# Patient Record
Sex: MALE | Employment: FULL TIME | URBAN - METROPOLITAN AREA
[De-identification: names, ages, dates, MRNs, and addresses within clinical notes are randomized per-mention and may not be internally consistent; named-entity substitution may affect disease eponyms.]

---

## 2019-04-05 PROBLEM — C79.51 METASTATIC CANCER TO BONE: Status: ACTIVE | Noted: 2019-04-05

## 2019-04-05 PROBLEM — D62 ACUTE BLOOD LOSS ANEMIA: Status: ACTIVE | Noted: 2019-04-05

## 2019-04-05 PROBLEM — D49.2 LUMBAR SPINE TUMOR: Status: ACTIVE | Noted: 2019-04-05

## 2019-04-05 PROBLEM — E87.1 HYPONATREMIA: Status: ACTIVE | Noted: 2019-04-05

## 2019-04-05 PROBLEM — S32.000A LUMBAR COMPRESSION FRACTURE: Status: ACTIVE | Noted: 2019-04-05

## 2019-04-05 NOTE — PLAN OF CARE
[facility] admissions ONLY: Please call extension [phone] upon patient arrival to floor for Hospital Medicine admit team assignment and for additional admit orders for the patient. Do not page the attending, staff physician or Advanced Practice Provider with the patient on arrival (may not be in-house at the time of arrival). Call back or wait to leave beeper number when prompted.    Outside Transfer Acceptance Note       Patients name/MRN: Evaristo Griffith/MRN [id number]     Referring Physician or Mid-Level provider giving report: Dr. Power Daigle (Hospital Medicine)  Contact number: [phone]    Referral Facility: [facility] in [place], [place]    Date/Time of Acceptance: 4/5/2019 1:35 PM    Accepting Physician for admission to hospital: Marilu Cassidy MD ()    Accepting facility: [facility] Med/Surg Telemetry    Consulting Physicians from [facility] involved in case:  Dr. Twin Briscoe ([facility])    Reason for transfer request: Needs Neurosurgery evaluation of tumor at L4 causing extraosseous compression on thecal sac at that level causing bilateral lower extremity weakness    Report from Physician/Mid-Level Provider/HPI: 64 y/o male with essential HTN, ETOH abuse and prior CVA was admitted to [facility] early this am on 4/5 with bilateral lower extremity weakness for about 1 week. Patient noted on admit to have mild OMARI of Cr 1.4 with mild hyponatremia with sodium of 126. Patient given IVFs and a Banana bag and last Cr down to 1.0 and sodium improved to 132. Patient on admit had Hgb 7.1 but after IVFs has now dropped to 6.5. Patient in process of being transfused 1 unit of PBCs. Patient with no obvious active bleeding but was started on IV Protonix at 40 mg BID. Patient denies any melena or BRBPR, epistaxis or hematuria or hemoptysis. Patient also on labs noted to have normal AST, ALT, T. pete but elevated .  Patient noted to have normal TSH, Vitamin B12 and folate levels. Patient noted on exam to have decreased strength in lower extremities 1/5 and decreased sensation but reflexes intact with some muscle wasting to lower extremities. Patient with no bladder or bowel incontinence. Rectal tone not checked. Due to leg weakness patient underwent MRI of brain that was unremarkable except for remote infarct but MRI of lumbar spine revealed spinal tumor at L4 with extraosseous extension causing compression to thecal sac at that level as well as widespread other osseous lesions of spine highly concerning for metastatic cancer. Patient with no history of cancer. Dr. Briscoe from Neurosurgery was conferenced into call and he stated patient likely would need spinal decompression but asked for hospital medicine admit due to other medical needs such as anemia evaluation and help with coordination of care as patient has suspected metastatic cancer of unknown primary. Dr. Briscoe asked for Neurosurgery to be consulted. Patient was told results of MRI by transferring physician.     VS: BP: 137/72  P: 97  R: 16  T: 98.2 F  Pulse Ox 99% on room air     Past Medical History: Essential HTN, Alcohol abuse, prior CVA, Coronary artery disease, HLP    Imaging:   MRI of brain: Per verbal report. Old remote infarct but no new infarct or mass or bleed.    MRI of lumbar spine: Per verbal report. Bony tumor at L4 with extraosseous extension and compression of thecal sac at that level. Patient also noted to have diffuse osseous bony abnormalities consistent with widespread metastatic cancer and favoring likely prostate cancer. Stenosis of spine at L5-S1 and L3-L4.     To Do List upon arrival:     Consult to Neurosurgery for evaluation for surgical decompression at L4   GI consult for acute blood loss anemia    Serial H/H and monitoring for any bleeding    Marilu Cassidy MD  Senior Staff Physician  Department of Hospital Medicine  Patient Flow Center/  [place]  [phone]

## 2019-04-06 ENCOUNTER — HOSPITAL ENCOUNTER (INPATIENT)
Facility: HOSPITAL | Age: 64
LOS: 5 days | Discharge: HOME | DRG: 478 | End: 2019-04-11
Attending: INTERNAL MEDICINE | Admitting: INTERNAL MEDICINE

## 2019-04-06 DIAGNOSIS — S32.000A LUMBAR COMPRESSION FRACTURE: ICD-10-CM

## 2019-04-06 LAB
ALBUMIN SERPL BCP-MCNC: 2.1 G/DL (ref 3.5–5.2)
ALP SERPL-CCNC: 526 U/L (ref 55–135)
ALT SERPL W/O P-5'-P-CCNC: 12 U/L (ref 10–44)
ANION GAP SERPL CALC-SCNC: 9 MMOL/L (ref 8–16)
AST SERPL-CCNC: 27 U/L (ref 10–40)
BASOPHILS # BLD AUTO: 0 K/UL (ref 0–0.2)
BASOPHILS NFR BLD: 0 % (ref 0–1.9)
BILIRUB SERPL-MCNC: 0.4 MG/DL (ref 0.1–1)
BUN SERPL-MCNC: 21 MG/DL (ref 8–23)
CALCIUM SERPL-MCNC: 8.8 MG/DL (ref 8.7–10.5)
CHLORIDE SERPL-SCNC: 103 MMOL/L (ref 95–110)
CO2 SERPL-SCNC: 19 MMOL/L (ref 23–29)
COMPLEXED PSA SERPL-MCNC: 588.8 NG/ML (ref 0–4)
CREAT SERPL-MCNC: 0.8 MG/DL (ref 0.5–1.4)
DIFFERENTIAL METHOD: ABNORMAL
EOSINOPHIL # BLD AUTO: 0 K/UL (ref 0–0.5)
EOSINOPHIL NFR BLD: 0 % (ref 0–8)
ERYTHROCYTE [DISTWIDTH] IN BLOOD BY AUTOMATED COUNT: 25 % (ref 11.5–14.5)
EST. GFR  (AFRICAN AMERICAN): >60 ML/MIN/1.73 M^2
EST. GFR  (NON AFRICAN AMERICAN): >60 ML/MIN/1.73 M^2
FERRITIN SERPL-MCNC: 210 NG/ML (ref 20–300)
GLUCOSE SERPL-MCNC: 180 MG/DL (ref 70–110)
HCT VFR BLD AUTO: 26.8 % (ref 40–54)
HGB BLD-MCNC: 8.1 G/DL (ref 14–18)
IMM GRANULOCYTES # BLD AUTO: 0.03 K/UL (ref 0–0.04)
IMM GRANULOCYTES NFR BLD AUTO: 0.4 % (ref 0–0.5)
IRON SERPL-MCNC: 23 UG/DL (ref 45–160)
LYMPHOCYTES # BLD AUTO: 0.4 K/UL (ref 1–4.8)
LYMPHOCYTES NFR BLD: 5.3 % (ref 18–48)
MAGNESIUM SERPL-MCNC: 2.1 MG/DL (ref 1.6–2.6)
MCH RBC QN AUTO: 24.3 PG (ref 27–31)
MCHC RBC AUTO-ENTMCNC: 30.2 G/DL (ref 32–36)
MCV RBC AUTO: 81 FL (ref 82–98)
MONOCYTES # BLD AUTO: 0.3 K/UL (ref 0.3–1)
MONOCYTES NFR BLD: 3.6 % (ref 4–15)
NEUTROPHILS # BLD AUTO: 7.6 K/UL (ref 1.8–7.7)
NEUTROPHILS NFR BLD: 90.7 % (ref 38–73)
NRBC BLD-RTO: 1 /100 WBC
PHOSPHATE SERPL-MCNC: 3.2 MG/DL (ref 2.7–4.5)
PLATELET # BLD AUTO: 251 K/UL (ref 150–350)
PMV BLD AUTO: 10.8 FL (ref 9.2–12.9)
POTASSIUM SERPL-SCNC: 4.4 MMOL/L (ref 3.5–5.1)
PROT SERPL-MCNC: 7 G/DL (ref 6–8.4)
RBC # BLD AUTO: 3.33 M/UL (ref 4.6–6.2)
SATURATED IRON: 9 % (ref 20–50)
SODIUM SERPL-SCNC: 131 MMOL/L (ref 136–145)
TOTAL IRON BINDING CAPACITY: 249 UG/DL (ref 250–450)
TRANSFERRIN SERPL-MCNC: 168 MG/DL (ref 200–375)
WBC # BLD AUTO: 8.35 K/UL (ref 3.9–12.7)

## 2019-04-06 PROCEDURE — 84165 PATHOLOGIST INTERPRETATION SPE: ICD-10-PCS | Mod: 26,,, | Performed by: PATHOLOGY

## 2019-04-06 PROCEDURE — 83540 ASSAY OF IRON: CPT

## 2019-04-06 PROCEDURE — 25000003 PHARM REV CODE 250: Performed by: HOSPITALIST

## 2019-04-06 PROCEDURE — 86334 PATHOLOGIST INTERPRETATION IFE: ICD-10-PCS | Mod: 26,,, | Performed by: PATHOLOGY

## 2019-04-06 PROCEDURE — 36415 COLL VENOUS BLD VENIPUNCTURE: CPT

## 2019-04-06 PROCEDURE — 80053 COMPREHEN METABOLIC PANEL: CPT

## 2019-04-06 PROCEDURE — 63600175 PHARM REV CODE 636 W HCPCS: Performed by: HOSPITALIST

## 2019-04-06 PROCEDURE — 99223 PR INITIAL HOSPITAL CARE,LEVL III: ICD-10-PCS | Mod: ,,, | Performed by: HOSPITALIST

## 2019-04-06 PROCEDURE — 84153 ASSAY OF PSA TOTAL: CPT

## 2019-04-06 PROCEDURE — 25500020 PHARM REV CODE 255: Performed by: HOSPITALIST

## 2019-04-06 PROCEDURE — 11000001 HC ACUTE MED/SURG PRIVATE ROOM

## 2019-04-06 PROCEDURE — A9585 GADOBUTROL INJECTION: HCPCS | Performed by: HOSPITALIST

## 2019-04-06 PROCEDURE — 82728 ASSAY OF FERRITIN: CPT

## 2019-04-06 PROCEDURE — 85025 COMPLETE CBC W/AUTO DIFF WBC: CPT

## 2019-04-06 PROCEDURE — 63600175 PHARM REV CODE 636 W HCPCS: Performed by: STUDENT IN AN ORGANIZED HEALTH CARE EDUCATION/TRAINING PROGRAM

## 2019-04-06 PROCEDURE — 25000003 PHARM REV CODE 250: Performed by: STUDENT IN AN ORGANIZED HEALTH CARE EDUCATION/TRAINING PROGRAM

## 2019-04-06 PROCEDURE — 99223 1ST HOSP IP/OBS HIGH 75: CPT | Mod: ,,, | Performed by: HOSPITALIST

## 2019-04-06 PROCEDURE — 86334 IMMUNOFIX E-PHORESIS SERUM: CPT | Mod: 26,,, | Performed by: PATHOLOGY

## 2019-04-06 PROCEDURE — 84165 PROTEIN E-PHORESIS SERUM: CPT

## 2019-04-06 PROCEDURE — 83735 ASSAY OF MAGNESIUM: CPT

## 2019-04-06 PROCEDURE — 86334 IMMUNOFIX E-PHORESIS SERUM: CPT

## 2019-04-06 PROCEDURE — 84100 ASSAY OF PHOSPHORUS: CPT

## 2019-04-06 PROCEDURE — 84165 PROTEIN E-PHORESIS SERUM: CPT | Mod: 26,,, | Performed by: PATHOLOGY

## 2019-04-06 PROCEDURE — 83520 IMMUNOASSAY QUANT NOS NONAB: CPT | Mod: 59

## 2019-04-06 RX ORDER — IBUPROFEN 200 MG
16 TABLET ORAL
Status: DISCONTINUED | OUTPATIENT
Start: 2019-04-06 | End: 2019-04-11 | Stop reason: HOSPADM

## 2019-04-06 RX ORDER — IBUPROFEN 200 MG
24 TABLET ORAL
Status: DISCONTINUED | OUTPATIENT
Start: 2019-04-06 | End: 2019-04-11 | Stop reason: HOSPADM

## 2019-04-06 RX ORDER — PANTOPRAZOLE SODIUM 40 MG/1
40 TABLET, DELAYED RELEASE ORAL DAILY
Status: DISCONTINUED | OUTPATIENT
Start: 2019-04-06 | End: 2019-04-11 | Stop reason: HOSPADM

## 2019-04-06 RX ORDER — AMLODIPINE BESYLATE 10 MG/1
10 TABLET ORAL DAILY
Status: DISCONTINUED | OUTPATIENT
Start: 2019-04-06 | End: 2019-04-06

## 2019-04-06 RX ORDER — SODIUM CHLORIDE 0.9 % (FLUSH) 0.9 %
10 SYRINGE (ML) INJECTION
Status: DISCONTINUED | OUTPATIENT
Start: 2019-04-06 | End: 2019-04-11 | Stop reason: HOSPADM

## 2019-04-06 RX ORDER — ACETAMINOPHEN 325 MG/1
650 TABLET ORAL EVERY 4 HOURS PRN
Status: DISCONTINUED | OUTPATIENT
Start: 2019-04-06 | End: 2019-04-11 | Stop reason: HOSPADM

## 2019-04-06 RX ORDER — ENOXAPARIN SODIUM 100 MG/ML
40 INJECTION SUBCUTANEOUS DAILY
Status: DISCONTINUED | OUTPATIENT
Start: 2019-04-06 | End: 2019-04-10

## 2019-04-06 RX ORDER — CARVEDILOL 25 MG/1
25 TABLET ORAL 2 TIMES DAILY
Status: DISCONTINUED | OUTPATIENT
Start: 2019-04-06 | End: 2019-04-06

## 2019-04-06 RX ORDER — GADOBUTROL 604.72 MG/ML
7 INJECTION INTRAVENOUS
Status: COMPLETED | OUTPATIENT
Start: 2019-04-06 | End: 2019-04-06

## 2019-04-06 RX ORDER — GLUCAGON 1 MG
1 KIT INJECTION
Status: DISCONTINUED | OUTPATIENT
Start: 2019-04-06 | End: 2019-04-11 | Stop reason: HOSPADM

## 2019-04-06 RX ORDER — CARVEDILOL 12.5 MG/1
12.5 TABLET ORAL 2 TIMES DAILY
Status: DISCONTINUED | OUTPATIENT
Start: 2019-04-06 | End: 2019-04-11 | Stop reason: HOSPADM

## 2019-04-06 RX ORDER — NAPROXEN SODIUM 220 MG/1
81 TABLET, FILM COATED ORAL DAILY
Status: DISCONTINUED | OUTPATIENT
Start: 2019-04-06 | End: 2019-04-07

## 2019-04-06 RX ORDER — CLOPIDOGREL BISULFATE 75 MG/1
75 TABLET ORAL DAILY
Status: DISCONTINUED | OUTPATIENT
Start: 2019-04-06 | End: 2019-04-07

## 2019-04-06 RX ADMIN — DEXAMETHASONE SODIUM PHOSPHATE 4 MG: 4 INJECTION, SOLUTION INTRA-ARTICULAR; INTRALESIONAL; INTRAMUSCULAR; INTRAVENOUS; SOFT TISSUE at 04:04

## 2019-04-06 RX ADMIN — CARVEDILOL 12.5 MG: 12.5 TABLET, FILM COATED ORAL at 09:04

## 2019-04-06 RX ADMIN — GADOBUTROL 7 ML: 604.72 INJECTION INTRAVENOUS at 02:04

## 2019-04-06 RX ADMIN — CLOPIDOGREL 75 MG: 75 TABLET, FILM COATED ORAL at 09:04

## 2019-04-06 RX ADMIN — PANTOPRAZOLE SODIUM 40 MG: 40 TABLET, DELAYED RELEASE ORAL at 09:04

## 2019-04-06 RX ADMIN — DEXAMETHASONE SODIUM PHOSPHATE 4 MG: 4 INJECTION, SOLUTION INTRA-ARTICULAR; INTRALESIONAL; INTRAMUSCULAR; INTRAVENOUS; SOFT TISSUE at 10:04

## 2019-04-06 RX ADMIN — DEXAMETHASONE SODIUM PHOSPHATE 4 MG: 4 INJECTION, SOLUTION INTRA-ARTICULAR; INTRALESIONAL; INTRAMUSCULAR; INTRAVENOUS; SOFT TISSUE at 09:04

## 2019-04-06 RX ADMIN — ENOXAPARIN SODIUM 40 MG: 100 INJECTION SUBCUTANEOUS at 04:04

## 2019-04-06 RX ADMIN — AMLODIPINE BESYLATE 10 MG: 10 TABLET ORAL at 09:04

## 2019-04-06 RX ADMIN — ASPIRIN 81 MG CHEWABLE TABLET 81 MG: 81 TABLET CHEWABLE at 09:04

## 2019-04-06 NOTE — H&P
[facility] Hospital Medicine  History & Physical    Patient Name: Evaristo Griffith  MRN: [id number]  Admission Date: 4/6/2019  Attending Physician: Susanna Pyle MD   Primary Care Provider: Primary Doctor [facility] Medicine Team: [facility] HOSP MED 5 Medhat Chairez MD     Patient information was obtained from patient, past medical records and ER records.     Subjective:     Principal Problem:Lumbar spine tumor    Chief Complaint: No chief complaint on file.       HPI: 64 y/o male with essential HTN, CAD, ETOH abuse and prior CVA was admitted to [facility] early this am on 4/5 with bilateral lower extremity weakness for about 1 week. He also complained of loss of sensation in bilateral legs, and issues with constipation.  By the time he had arrived at the ED in [place] he states that most of his symptoms had began to resolve.  Patient noted on admit to have mild OMARI of Cr 1.4 with mild hyponatremia with sodium of 126. Patient given IVFs and a Banana bag and last Cr down to 1.0 and sodium improved to 132. Patient on admit had Hgb 7.1 but after IVFs has now dropped to 6.5. Patient in process of being transfused 1 unit of PBCs. Patient with no obvious active bleeding but was started on IV Protonix at 40 mg BID. Patient denies any melena or BRBPR, epistaxis or hematuria or hemoptysis. Patient also on labs noted to have normal AST, ALT, T. pete but elevated . Patient noted to have normal TSH, Vitamin B12 and folate levels. Patient noted on exam to have decreased strength in lower extremities 1/5 and decreased sensation but reflexes intact with some muscle wasting to lower extremities. Patient with no bladder or bowel incontinence. Rectal tone not checked. Due to leg weakness patient underwent MRI of brain that was unremarkable except for remote infarct but MRI of lumbar spine revealed spinal tumor at L4 with extraosseous extension causing compression to  thecal sac at that level as well as widespread other osseous lesions of spine highly concerning for metastatic cancer. Patient with no history of cancer. Dr. Briscoe from Neurosurgery was conferenced into call and he stated patient likely would need spinal decompression but asked for hospital medicine admit due to other medical needs such as anemia evaluation and help with coordination of care as patient has suspected metastatic cancer of unknown primary. Dr. Brisceo asked for Neurosurgery to be consulted. Patient was told results of MRI by transferring physician.         Past Medical History:   Diagnosis Date    Alcohol abuse     Coronary artery disease involving native coronary artery of native heart     Essential hypertension     Hyperlipidemia     Stroke        History reviewed. No pertinent surgical history.    Review of patient's allergies indicates:  No Known Allergies    No current facility-administered medications on file prior to encounter.      No current outpatient medications on file prior to encounter.     Family History     None        Tobacco Use    Smoking status: Not on file   Substance and Sexual Activity    Alcohol use: Not on file    Drug use: Not on file    Sexual activity: Not on file     Review of Systems   Constitutional: Positive for activity change and appetite change. Negative for chills, fatigue and fever.   HENT: Negative for congestion and sinus pain.    Eyes: Negative for visual disturbance.   Respiratory: Negative for cough and shortness of breath.    Cardiovascular: Negative for chest pain and palpitations.   Gastrointestinal: Negative for abdominal distention and abdominal pain.   Genitourinary: Positive for difficulty urinating.   Musculoskeletal: Positive for back pain and gait problem. Negative for arthralgias.   Skin: Negative for color change and pallor.   Neurological: Positive for weakness and numbness. Negative for dizziness, light-headedness and headaches.      Objective:     Vital Signs (Most Recent):    Vital Signs (24h Range):  Temp:  [97.8 °F (36.6 °C)-98.2 °F (36.8 °C)] 97.8 °F (36.6 °C)  Pulse:  [70-97] 70  Resp:  [16-18] 18  SpO2:  [98 %-100 %] 100 %  BP: (119-137)/(72-91) 132/91        There is no height or weight on file to calculate BMI.    Physical Exam   Constitutional: He is oriented to person, place, and time. He appears well-developed and well-nourished. No distress.   HENT:   Head: Normocephalic and atraumatic.   Eyes: Conjunctivae and EOM are normal.   Neck: Normal range of motion.   Cardiovascular: Normal rate, regular rhythm and normal heart sounds.   No murmur heard.  Pulmonary/Chest: Effort normal and breath sounds normal. No respiratory distress.   Abdominal: Soft. Bowel sounds are normal. He exhibits no distension. There is no tenderness.   Musculoskeletal: Normal range of motion. He exhibits no edema or deformity.   Neurological: He is alert and oriented to person, place, and time. No cranial nerve deficit or sensory deficit.   5/5 strength BUE and BLE  5/5 plantar and dorsiflexion   Skin: Skin is warm. Capillary refill takes less than 2 seconds.   Nursing note and vitals reviewed.        CRANIAL NERVES     CN III, IV, VI   Extraocular motions are normal.        Significant Labs:   Recent Lab Results     None          Significant Imaging: I have reviewed all pertinent imaging results/findings within the past 24 hours.    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  Patient had BLE weakness for 3 days with associated constipation and loss of sensation in the LE but had resolved by the time he went to the ED.  At this time his strength and sensation has returned to full function.    -Neurosurgery consulted, know that patient is being transferred here  -F/U MRI spine      Hyponatremia  Most recent  measured Na was 132 according to transfer documents.  Will check CMP and assess if fluid restriction is needed to return Na to wnl      Acute blood loss anemia  No obvious signs of bleeding.  Was intiailly 7.1 on presentation, received IVF and subsequent Hgb was 6.5.  Received 1u pRBC before transfer.  Will check morning CBC and monitor for any signs of bleeding      Coronary artery disease involving native coronary artery of native heart  Continuing ASA and plavix      Essential hypertension  Is on coreg 25 BID and norvasc 10 at home but reports only taking it when he has a headache.  Holding these medications for now as he is normotensive on most recent BP        VTE Risk Mitigation (From admission, onward)        Ordered     enoxaparin injection 40 mg  Daily      04/06/19 0535     IP VTE HIGH RISK PATIENT  Once      04/06/19 0535             Medhat Chairez MD  Department of Hospital Medicine   [facility]

## 2019-04-06 NOTE — ASSESSMENT & PLAN NOTE
Is on coreg 25 BID and norvasc 10 at home but reports only taking it when he has a headache.  Holding these medications for now as he is normotensive on most recent BP

## 2019-04-06 NOTE — ASSESSMENT & PLAN NOTE
MRI at outside hospital revealed an L4 spinal tumor without numerous other lesions throughout the spine that is concerning for metastatic disease.  Patient had BLE weakness for 3 days with associated constipation and loss of sensation in the LE but had resolved by the time he went to the ED.  At this time his strength and sensation has returned to full function.    -Neurosurgery consulted, know that patient is being transferred here  -F/U MRI spine

## 2019-04-06 NOTE — ASSESSMENT & PLAN NOTE
Most recent measured Na was 132 according to transfer documents.  Will check CMP and assess if fluid restriction is needed to return Na to wnl

## 2019-04-06 NOTE — LETTER
April 7, 2019    Evaristo Griffith  [address] [place] [address]                [address] [facility] [address]  Phone: [phone]  Fax: [phone] To whom it may concern in regards to Paul Griffith:    Please excuse Mr. Paul Griffith to take care of his father, Mr. Evaristo Griffith, during his current hospitalization from 4/06/2019 to estimated date of discharge to be 4/12/2019    If you have any questions or concerns, please don't hesitate to call the Department of Internal Medicine.    Sincerely,        Briseida Maza MD

## 2019-04-06 NOTE — SUBJECTIVE & OBJECTIVE
Past Medical History:   Diagnosis Date    Alcohol abuse     Coronary artery disease involving native coronary artery of native heart     Essential hypertension     Hyperlipidemia     Stroke        History reviewed. No pertinent surgical history.    Review of patient's allergies indicates:  No Known Allergies    No current facility-administered medications on file prior to encounter.      No current outpatient medications on file prior to encounter.     Family History     None        Tobacco Use    Smoking status: Not on file   Substance and Sexual Activity    Alcohol use: Not on file    Drug use: Not on file    Sexual activity: Not on file     Review of Systems   Constitutional: Positive for activity change and appetite change. Negative for chills, fatigue and fever.   HENT: Negative for congestion and sinus pain.    Eyes: Negative for visual disturbance.   Respiratory: Negative for cough and shortness of breath.    Cardiovascular: Negative for chest pain and palpitations.   Gastrointestinal: Negative for abdominal distention and abdominal pain.   Genitourinary: Positive for difficulty urinating.   Musculoskeletal: Positive for back pain and gait problem. Negative for arthralgias.   Skin: Negative for color change and pallor.   Neurological: Positive for weakness and numbness. Negative for dizziness, light-headedness and headaches.     Objective:     Vital Signs (Most Recent):    Vital Signs (24h Range):  Temp:  [97.8 °F (36.6 °C)-98.2 °F (36.8 °C)] 97.8 °F (36.6 °C)  Pulse:  [70-97] 70  Resp:  [16-18] 18  SpO2:  [98 %-100 %] 100 %  BP: (119-137)/(72-91) 132/91        There is no height or weight on file to calculate BMI.    Physical Exam   Constitutional: He is oriented to person, place, and time. He appears well-developed and well-nourished. No distress.   HENT:   Head: Normocephalic and atraumatic.   Eyes: Conjunctivae and EOM are normal.   Neck: Normal range of motion.   Cardiovascular: Normal rate,  regular rhythm and normal heart sounds.   No murmur heard.  Pulmonary/Chest: Effort normal and breath sounds normal. No respiratory distress.   Abdominal: Soft. Bowel sounds are normal. He exhibits no distension. There is no tenderness.   Musculoskeletal: Normal range of motion. He exhibits no edema or deformity.   Neurological: He is alert and oriented to person, place, and time. No cranial nerve deficit or sensory deficit.   5/5 strength BUE and BLE  5/5 plantar and dorsiflexion   Skin: Skin is warm. Capillary refill takes less than 2 seconds.   Nursing note and vitals reviewed.        CRANIAL NERVES     CN III, IV, VI   Extraocular motions are normal.        Significant Labs:   Recent Lab Results     None          Significant Imaging: I have reviewed all pertinent imaging results/findings within the past 24 hours.

## 2019-04-06 NOTE — ASSESSMENT & PLAN NOTE
No obvious signs of bleeding.  Was intiailly 7.1 on presentation, received IVF and subsequent Hgb was 6.5.  Received 1u pRBC before transfer.  Will check morning CBC and monitor for any signs of bleeding

## 2019-04-06 NOTE — HPI
62 y/o male with essential HTN, CAD, ETOH abuse and prior CVA was admitted to [facility] early this am on 4/5 with bilateral lower extremity weakness for about 1 week. He also complained of loss of sensation in bilateral legs, and issues with constipation.  By the time he had arrived at the ED in [place] he states that most of his symptoms had began to resolve.  Patient noted on admit to have mild OMARI of Cr 1.4 with mild hyponatremia with sodium of 126. Patient given IVFs and a Banana bag and last Cr down to 1.0 and sodium improved to 132. Patient on admit had Hgb 7.1 but after IVFs has now dropped to 6.5. Patient in process of being transfused 1 unit of PBCs. Patient with no obvious active bleeding but was started on IV Protonix at 40 mg BID. Patient denies any melena or BRBPR, epistaxis or hematuria or hemoptysis. Patient also on labs noted to have normal AST, ALT, T. pete but elevated . Patient noted to have normal TSH, Vitamin B12 and folate levels. Patient noted on exam to have decreased strength in lower extremities 1/5 and decreased sensation but reflexes intact with some muscle wasting to lower extremities. Patient with no bladder or bowel incontinence. Rectal tone not checked. Due to leg weakness patient underwent MRI of brain that was unremarkable except for remote infarct but MRI of lumbar spine revealed spinal tumor at L4 with extraosseous extension causing compression to thecal sac at that level as well as widespread other osseous lesions of spine highly concerning for metastatic cancer. Patient with no history of cancer. Dr. Briscoe from Neurosurgery was conferenced into call and he stated patient likely would need spinal decompression but asked for hospital medicine admit due to other medical needs such as anemia evaluation and help with coordination of care as patient has suspected metastatic cancer of unknown primary. Dr. Briscoe asked for Neurosurgery to be  consulted. Patient was told results of MRI by transferring physician.

## 2019-04-07 LAB
ABO + RH BLD: NORMAL
ALBUMIN SERPL BCP-MCNC: 2.1 G/DL (ref 3.5–5.2)
ALP SERPL-CCNC: 478 U/L (ref 55–135)
ALT SERPL W/O P-5'-P-CCNC: 23 U/L (ref 10–44)
ANION GAP SERPL CALC-SCNC: 10 MMOL/L (ref 8–16)
APTT BLDCRRT: 30.6 SEC (ref 21–32)
AST SERPL-CCNC: 43 U/L (ref 10–40)
BASOPHILS # BLD AUTO: 0 K/UL (ref 0–0.2)
BASOPHILS NFR BLD: 0 % (ref 0–1.9)
BILIRUB SERPL-MCNC: 0.3 MG/DL (ref 0.1–1)
BLD GP AB SCN CELLS X3 SERPL QL: NORMAL
BUN SERPL-MCNC: 21 MG/DL (ref 8–23)
CALCIUM SERPL-MCNC: 8.3 MG/DL (ref 8.7–10.5)
CHLORIDE SERPL-SCNC: 103 MMOL/L (ref 95–110)
CO2 SERPL-SCNC: 18 MMOL/L (ref 23–29)
CREAT SERPL-MCNC: 0.8 MG/DL (ref 0.5–1.4)
DIFFERENTIAL METHOD: ABNORMAL
EOSINOPHIL # BLD AUTO: 0 K/UL (ref 0–0.5)
EOSINOPHIL NFR BLD: 0 % (ref 0–8)
ERYTHROCYTE [DISTWIDTH] IN BLOOD BY AUTOMATED COUNT: 25.2 % (ref 11.5–14.5)
EST. GFR  (AFRICAN AMERICAN): >60 ML/MIN/1.73 M^2
EST. GFR  (NON AFRICAN AMERICAN): >60 ML/MIN/1.73 M^2
GLUCOSE SERPL-MCNC: 186 MG/DL (ref 70–110)
HCT VFR BLD AUTO: 24.8 % (ref 40–54)
HGB BLD-MCNC: 7.5 G/DL (ref 14–18)
IMM GRANULOCYTES # BLD AUTO: 0.06 K/UL (ref 0–0.04)
IMM GRANULOCYTES NFR BLD AUTO: 0.7 % (ref 0–0.5)
INR PPP: 1.1 (ref 0.8–1.2)
LYMPHOCYTES # BLD AUTO: 0.4 K/UL (ref 1–4.8)
LYMPHOCYTES NFR BLD: 5.1 % (ref 18–48)
MAGNESIUM SERPL-MCNC: 1.8 MG/DL (ref 1.6–2.6)
MCH RBC QN AUTO: 24 PG (ref 27–31)
MCHC RBC AUTO-ENTMCNC: 30.2 G/DL (ref 32–36)
MCV RBC AUTO: 80 FL (ref 82–98)
MONOCYTES # BLD AUTO: 0.5 K/UL (ref 0.3–1)
MONOCYTES NFR BLD: 6.1 % (ref 4–15)
NEUTROPHILS # BLD AUTO: 7.6 K/UL (ref 1.8–7.7)
NEUTROPHILS NFR BLD: 88.1 % (ref 38–73)
NRBC BLD-RTO: 1 /100 WBC
PHOSPHATE SERPL-MCNC: 2.6 MG/DL (ref 2.7–4.5)
PLATELET # BLD AUTO: 266 K/UL (ref 150–350)
PMV BLD AUTO: 10.4 FL (ref 9.2–12.9)
POTASSIUM SERPL-SCNC: 4.4 MMOL/L (ref 3.5–5.1)
PROT SERPL-MCNC: 6.6 G/DL (ref 6–8.4)
PROTHROMBIN TIME: 11.5 SEC (ref 9–12.5)
RBC # BLD AUTO: 3.12 M/UL (ref 4.6–6.2)
SODIUM SERPL-SCNC: 131 MMOL/L (ref 136–145)
WBC # BLD AUTO: 8.67 K/UL (ref 3.9–12.7)

## 2019-04-07 PROCEDURE — 63600175 PHARM REV CODE 636 W HCPCS: Performed by: HOSPITALIST

## 2019-04-07 PROCEDURE — 85730 THROMBOPLASTIN TIME PARTIAL: CPT

## 2019-04-07 PROCEDURE — 80053 COMPREHEN METABOLIC PANEL: CPT

## 2019-04-07 PROCEDURE — 11000001 HC ACUTE MED/SURG PRIVATE ROOM

## 2019-04-07 PROCEDURE — 25500020 PHARM REV CODE 255: Performed by: HOSPITALIST

## 2019-04-07 PROCEDURE — 85025 COMPLETE CBC W/AUTO DIFF WBC: CPT

## 2019-04-07 PROCEDURE — 86850 RBC ANTIBODY SCREEN: CPT

## 2019-04-07 PROCEDURE — 25000003 PHARM REV CODE 250: Performed by: HOSPITALIST

## 2019-04-07 PROCEDURE — 99223 PR INITIAL HOSPITAL CARE,LEVL III: ICD-10-PCS | Mod: ,,, | Performed by: NEUROLOGICAL SURGERY

## 2019-04-07 PROCEDURE — 94761 N-INVAS EAR/PLS OXIMETRY MLT: CPT

## 2019-04-07 PROCEDURE — 99232 PR SUBSEQUENT HOSPITAL CARE,LEVL II: ICD-10-PCS | Mod: ,,, | Performed by: HOSPITALIST

## 2019-04-07 PROCEDURE — 83735 ASSAY OF MAGNESIUM: CPT

## 2019-04-07 PROCEDURE — 99232 SBSQ HOSP IP/OBS MODERATE 35: CPT | Mod: ,,, | Performed by: HOSPITALIST

## 2019-04-07 PROCEDURE — 63600175 PHARM REV CODE 636 W HCPCS: Performed by: STUDENT IN AN ORGANIZED HEALTH CARE EDUCATION/TRAINING PROGRAM

## 2019-04-07 PROCEDURE — 36415 COLL VENOUS BLD VENIPUNCTURE: CPT

## 2019-04-07 PROCEDURE — 99223 1ST HOSP IP/OBS HIGH 75: CPT | Mod: ,,, | Performed by: NEUROLOGICAL SURGERY

## 2019-04-07 PROCEDURE — 84100 ASSAY OF PHOSPHORUS: CPT

## 2019-04-07 PROCEDURE — 25000003 PHARM REV CODE 250: Performed by: STUDENT IN AN ORGANIZED HEALTH CARE EDUCATION/TRAINING PROGRAM

## 2019-04-07 PROCEDURE — 85610 PROTHROMBIN TIME: CPT

## 2019-04-07 PROCEDURE — 25500020 PHARM REV CODE 255: Performed by: STUDENT IN AN ORGANIZED HEALTH CARE EDUCATION/TRAINING PROGRAM

## 2019-04-07 RX ORDER — GADOBUTROL 604.72 MG/ML
7 INJECTION INTRAVENOUS
Status: COMPLETED | OUTPATIENT
Start: 2019-04-08 | End: 2019-04-07

## 2019-04-07 RX ORDER — ACETAMINOPHEN 500 MG
500-1000 TABLET ORAL DAILY PRN
COMMUNITY

## 2019-04-07 RX ORDER — LORAZEPAM 1 MG/1
1 TABLET ORAL ONCE AS NEEDED
Status: COMPLETED | OUTPATIENT
Start: 2019-04-07 | End: 2019-04-07

## 2019-04-07 RX ADMIN — CARVEDILOL 12.5 MG: 12.5 TABLET, FILM COATED ORAL at 08:04

## 2019-04-07 RX ADMIN — IOHEXOL 15 ML: 350 INJECTION, SOLUTION INTRAVENOUS at 09:04

## 2019-04-07 RX ADMIN — DEXAMETHASONE SODIUM PHOSPHATE 4 MG: 4 INJECTION, SOLUTION INTRA-ARTICULAR; INTRALESIONAL; INTRAMUSCULAR; INTRAVENOUS; SOFT TISSUE at 01:04

## 2019-04-07 RX ADMIN — IOHEXOL 75 ML: 350 INJECTION, SOLUTION INTRAVENOUS at 10:04

## 2019-04-07 RX ADMIN — PANTOPRAZOLE SODIUM 40 MG: 40 TABLET, DELAYED RELEASE ORAL at 08:04

## 2019-04-07 RX ADMIN — DEXAMETHASONE SODIUM PHOSPHATE 4 MG: 4 INJECTION, SOLUTION INTRA-ARTICULAR; INTRALESIONAL; INTRAMUSCULAR; INTRAVENOUS; SOFT TISSUE at 05:04

## 2019-04-07 RX ADMIN — LORAZEPAM 1 MG: 1 TABLET ORAL at 10:04

## 2019-04-07 RX ADMIN — CLOPIDOGREL 75 MG: 75 TABLET, FILM COATED ORAL at 08:04

## 2019-04-07 RX ADMIN — ENOXAPARIN SODIUM 40 MG: 100 INJECTION SUBCUTANEOUS at 05:04

## 2019-04-07 RX ADMIN — DEXAMETHASONE SODIUM PHOSPHATE 4 MG: 4 INJECTION, SOLUTION INTRA-ARTICULAR; INTRALESIONAL; INTRAMUSCULAR; INTRAVENOUS; SOFT TISSUE at 06:04

## 2019-04-07 RX ADMIN — GADOBUTROL 7 ML: 604.72 INJECTION INTRAVENOUS at 11:04

## 2019-04-07 RX ADMIN — IOHEXOL 15 ML: 350 INJECTION, SOLUTION INTRAVENOUS at 08:04

## 2019-04-07 RX ADMIN — ASPIRIN 81 MG CHEWABLE TABLET 81 MG: 81 TABLET CHEWABLE at 08:04

## 2019-04-07 NOTE — ASSESSMENT & PLAN NOTE
63M PMH HTN, CAD, ETOH abuse and prior CVA presenting with 1 week of BLE weakness and numbness that has resolved. MRI reveals L4 mass and osseous lesions concerning for metastatic disease.      -- Obtain CT chest/abdomen/pelvis metastatic workup  -- PSA elevated 500s

## 2019-04-07 NOTE — ASSESSMENT & PLAN NOTE
Is on coreg 25 BID and norvasc 10 at home but reports only taking it when he has a headache. Held earlier for hypotension. Added back coreg

## 2019-04-07 NOTE — PLAN OF CARE
Problem: Adult Inpatient Plan of Care  Goal: Plan of Care Review  Outcome: Ongoing (interventions implemented as appropriate)  Pt remains free of falls and injury. Pt makes statement of no pain. Pt using urinal for 24 hour urine collection. Bed low and locked, Call light within reach.

## 2019-04-07 NOTE — PROGRESS NOTES
Pt stated he used the toilet instead of the urinal 4-5x today. 24 hr urine collection d/c.  notified. 24 hr urine restarted 1400.

## 2019-04-07 NOTE — ASSESSMENT & PLAN NOTE
63M PMH HTN, CAD, ETOH abuse and prior CVA presenting with 1 week of BLE weakness and numbness that has resolved. MRI reveals L4 mass and osseous lesions concerning for metastatic disease. Neurointact on my exam.    -- No acute neurosurgical intervention necessary.   -- Keep NPO for now please.  -- Obtain CT chest/abdomen/pelvis metastatic workup.  -- Obtain CT lumbar spine without contrast.  -- Dex 4q6  -- Neurochecks q4h. Call NSGY with decline in exam.  -- Please measure post void residuals.  -- Further care per primary team.   -- Further recs to follow pending attending review.

## 2019-04-07 NOTE — SUBJECTIVE & OBJECTIVE
Past Medical History:   Diagnosis Date    Alcohol abuse     Coronary artery disease involving native coronary artery of native heart     Essential hypertension     Hyperlipidemia     Stroke        History reviewed. No pertinent surgical history.    Review of patient's allergies indicates:  No Known Allergies    No current facility-administered medications on file prior to encounter.      Current Outpatient Medications on File Prior to Encounter   Medication Sig    acetaminophen (TYLENOL) 500 MG tablet Take 500-1,000 mg by mouth daily as needed (back pain).    ASA/acetaminophen/caffeine/pot (GOODY'S HEADACHE POWDER ORAL) Take 2 Packages by mouth daily as needed (back pain).     Family History     None        Tobacco Use    Smoking status: Current Every Day Smoker     Packs/day: 1.00     Years: 55.00     Pack years: 55.00     Types: Cigarettes    Smokeless tobacco: Never Used   Substance and Sexual Activity    Alcohol use: Not on file    Drug use: Not on file    Sexual activity: Not on file     Review of Systems   Constitutional: Positive for activity change and appetite change. Negative for chills, fatigue and fever.   HENT: Negative for congestion and sinus pain.    Eyes: Negative for visual disturbance.   Respiratory: Negative for cough and shortness of breath.    Cardiovascular: Negative for chest pain and palpitations.   Gastrointestinal: Negative for abdominal distention and abdominal pain.   Genitourinary: Positive for difficulty urinating.   Musculoskeletal: Positive for back pain and gait problem. Negative for arthralgias.   Skin: Negative for color change and pallor.   Neurological: Positive for weakness and numbness. Negative for dizziness, light-headedness and headaches.     Objective:     Vital Signs (Most Recent):  Temp: 96.7 °F (35.9 °C) (04/07/19 1126)  Pulse: 62 (04/07/19 1126)  Resp: 18 (04/07/19 1126)  BP: (!) 150/79 (04/07/19 1126)  SpO2: 97 % (04/07/19 1126) Vital Signs (24h  Range):  Temp:  [96.7 °F (35.9 °C)-98.4 °F (36.9 °C)] 96.7 °F (35.9 °C)  Pulse:  [62-71] 62  Resp:  [15-18] 18  SpO2:  [96 %-100 %] 97 %  BP: (139-165)/(68-85) 150/79     Weight: 63.6 kg (140 lb 3.4 oz)  Body mass index is 19.02 kg/m².    Physical Exam   Constitutional: He is oriented to person, place, and time. He appears well-developed and well-nourished. No distress.   HENT:   Head: Normocephalic and atraumatic.   Eyes: Conjunctivae and EOM are normal.   Neck: Normal range of motion.   Cardiovascular: Normal rate, regular rhythm and normal heart sounds.   No murmur heard.  Pulmonary/Chest: Effort normal and breath sounds normal. No respiratory distress.   Abdominal: Soft. Bowel sounds are normal. He exhibits no distension. There is no tenderness.   Musculoskeletal: Normal range of motion. He exhibits no edema or deformity.   Neurological: He is alert and oriented to person, place, and time. No cranial nerve deficit or sensory deficit.   5/5 strength BUE and BLE  5/5 plantar and dorsiflexion   Skin: Skin is warm. Capillary refill takes less than 2 seconds.   Nursing note and vitals reviewed.        CRANIAL NERVES     CN III, IV, VI   Extraocular motions are normal.        Significant Labs:   Recent Lab Results       04/07/19  0521   04/07/19  0429        Albumin   2.1     Alkaline Phosphatase   478     ALT   23     Anion Gap   10     aPTT 30.6  Comment:  aPTT therapeutic range = 39-69 seconds       AST   43     Baso #   0.00     Basophil%   0.0     Total Bilirubin   0.3  Comment:  For infants and newborns, interpretation of results should be based  on gestational age, weight and in agreement with clinical  observations.  Premature Infant recommended reference ranges:  Up to 24 hours.............<8.0 mg/dL  Up to 48 hours............<12.0 mg/dL  3-5 days..................<15.0 mg/dL  6-29 days.................<15.0 mg/dL       BUN, Bld   21     Calcium   8.3     Chloride   103     CO2   18     Creatinine   0.8      Differential Method   Automated     eGFR if    >60.0     eGFR if non    >60.0  Comment:  Calculation used to obtain the estimated glomerular filtration  rate (eGFR) is the CKD-EPI equation.        Eos #   0.0     Eosinophil%   0.0     Glucose   186     Gran # (ANC)   7.6     Gran%   88.1     Group & Rh O POS       Hematocrit   24.8     Hemoglobin   7.5     Immature Grans (Abs)   0.06  Comment:  Mild elevation in immature granulocytes is non specific and   can be seen in a variety of conditions including stress response,   acute inflammation, trauma and pregnancy. Correlation with other   laboratory and clinical findings is essential.       Immature Granulocytes   0.7     INDIRECT GÓMEZ NEG       Coumadin Monitoring INR 1.1  Comment:  Coumadin Therapy:  2.0 - 3.0 for INR for all indicators except mechanical heart valves  and antiphospholipid syndromes which should use 2.5 - 3.5.         Lymph #   0.4     Lymph%   5.1     Magnesium   1.8     MCH   24.0     MCHC   30.2     MCV   80     Mono #   0.5     Mono%   6.1     MPV   10.4     nRBC   1     Phosphorus   2.6     Platelets   266     Potassium   4.4     Total Protein   6.6     Protime 11.5       RBC   3.12     RDW   25.2     Sodium   131     WBC   8.67           Significant Imaging: I have reviewed all pertinent imaging results/findings within the past 24 hours.

## 2019-04-07 NOTE — ASSESSMENT & PLAN NOTE
MRI at outside hospital revealed an L4 spinal tumor without numerous other lesions throughout the spine that is concerning for metastatic disease.  Patient had BLE weakness for 3 days with associated constipation and loss of sensation in the LE but had resolved by the time he went to the ED.  At this time his strength and sensation has returned to full function.    63M PMH HTN, CAD, ETOH abuse and prior CVA presenting with 1 week of BLE weakness and numbness that has resolved. MRI reveals L4 mass and osseous lesions concerning for metastatic disease. Neurointact on my exam.     -- decadron 4mg q 6hr  -- he will require mass biopsy  -- NPO at [facility]   -- Obtaining CT chest/abdomen/pelvis metastatic workup.  -- Obtain CT lumbar spine without contrast.  -- Repeating MRI lumbar   -- Neurochecks q4h. Call NSGY with decline in exam.

## 2019-04-07 NOTE — HPI
Patient is a 63 year old male with a history of HTN, CAD, ETOH abuse and prior CVA who complained of BLE weakness of about 1 week. He also complained of loss of sensation in bilateral legs, and issues with constipation. Denies bowel or bladder incontinence. His symptoms have since resolved. Due to leg weakness patient underwent MRI of brain that was unremarkable except for remote infarct but MRI of lumbar spine revealed spinal tumor at L4 with extraosseous extension causing compression to thecal sac at that level as well as widespread other osseous lesions of spine highly concerning for metastatic cancer. Patient with no history of cancer. Patient is a current smoker. Has no other complaints.

## 2019-04-07 NOTE — PLAN OF CARE
Problem: Adult Inpatient Plan of Care  Goal: Plan of Care Review  Outcome: Ongoing (interventions implemented as appropriate)     04/07/19 1633   Plan of Care Review   Plan of Care Reviewed With patient   Progress improving     Pt resting aaox4 no c/o pain or discomfort. V/s stable. Ambulates in room w/o complaint. Voids via urinal. 24 hr urine collects ends a 1400 4/8. Pt Instructed to use call light when he voids. Verbalized understanding. Pt scheduled for repeat mri. Prn ativan available for procedure upon request. Family at bedside. Will continue to monitor and interventions as appropriate.

## 2019-04-07 NOTE — SUBJECTIVE & OBJECTIVE
No medications prior to admission.       Review of patient's allergies indicates:  No Known Allergies    Past Medical History:   Diagnosis Date    Alcohol abuse     Coronary artery disease involving native coronary artery of native heart     Essential hypertension     Hyperlipidemia     Stroke      History reviewed. No pertinent surgical history.  Family History     None        Tobacco Use    Smoking status: Current Every Day Smoker     Packs/day: 1.00     Years: 55.00     Pack years: 55.00     Types: Cigarettes    Smokeless tobacco: Never Used   Substance and Sexual Activity    Alcohol use: Not on file    Drug use: Not on file    Sexual activity: Not on file     Review of Systems   Constitutional: Negative for chills and fever.   HENT: Negative for nosebleeds and rhinorrhea.    Eyes: Negative for photophobia and visual disturbance.   Respiratory: Negative for chest tightness and shortness of breath.    Cardiovascular: Negative for chest pain and palpitations.   Gastrointestinal: Positive for constipation. Negative for abdominal pain, nausea and vomiting.   Genitourinary: Negative for dysuria.   Musculoskeletal: Negative for back pain and neck pain.   Neurological: Positive for weakness and numbness. Negative for dizziness, syncope and headaches.   Psychiatric/Behavioral: Negative for behavioral problems and confusion.     Objective:     Weight: 63.6 kg (140 lb 3.4 oz)  Body mass index is 19.02 kg/m².  Vital Signs (Most Recent):  Temp: 98 °F (36.7 °C) (04/07/19 0017)  Pulse: 69 (04/07/19 0017)  Resp: 18 (04/07/19 0017)  BP: 139/68 (04/07/19 0017)  SpO2: 96 % (04/07/19 0017) Vital Signs (24h Range):  Temp:  [96.3 °F (35.7 °C)-98 °F (36.7 °C)] 98 °F (36.7 °C)  Pulse:  [66-72] 69  Resp:  [15-20] 18  SpO2:  [96 %-100 %] 96 %  BP: (139-154)/(68-81) 139/68       Neurosurgery Physical Exam    General: well developed, well nourished, no distress.   Head: normocephalic, atraumatic  Neurologic: Alert and oriented.  Thought content appropriate.  GCS: Motor: 6/Verbal: 5/Eyes: 4 GCS Total: 15  Mental Status: Awake, Alert, Oriented x 4  Language: No aphasia  Speech: No dysarthria  Cranial nerves: face symmetric, tongue midline, CN II-XII grossly intact.   Eyes: pupils equal, round, reactive to light with accomodation, EOMI.  Pulmonary: normal respirations, no signs of respiratory distress  Abdomen: soft, non-distended  Sensory: intact to light touch throughout  Motor Strength: Moves all extremities spontaneously with good tone.  Full strength upper and lower extremities. No abnormal movements seen.     Strength  Deltoids Triceps Biceps Wrist Extension Wrist Flexion Hand    Upper: R 5/5 5/5 5/5 5/5 5/5 5/5    L 5/5 5/5 5/5 5/5 5/5 5/5     Iliopsoas Quadriceps Knee  Flexion Tibialis  anterior Gastro- cnemius EHL   Lower: R 5/5 5/5 5/5 5/5 5/5 5/5    L 5/5 5/5 5/5 5/5 5/5 5/5     DTR's - 2 + and symmetric in UE and LE  Pronator Drift: no drift noted  Finger-to-nose: Intact bilaterally  Bah: absent  Clonus: absent  Pulses: 2+ and symmetric radial and dorsalis pedis.  Skin: Skin is warm, dry and intact.        Significant Labs:  Recent Labs   Lab 04/06/19  0546   *   *   K 4.4      CO2 19*   BUN 21   CREATININE 0.8   CALCIUM 8.8   MG 2.1     Recent Labs   Lab 04/06/19  0546   WBC 8.35   HGB 8.1*   HCT 26.8*        No results for input(s): LABPT, INR, APTT in the last 48 hours.  Microbiology Results (last 7 days)     ** No results found for the last 168 hours. **        All pertinent labs from the last 24 hours have been reviewed.    Significant Diagnostics:  I have reviewed all pertinent imaging results/findings within the past 24 hours.

## 2019-04-07 NOTE — HOSPITAL COURSE
Patient denies any symptoms of weakness at this time. Denies loss of sensation. He is able to walk and control urination & bowel movements. 4/8, patient reports feeling well over all although he is still straining occasionally to urinate. Neurosurgery has evaluated him, no acute intervention at this time, and they have recommended consult to radiation oncology. Interventional radiology has recommended vertebral bone biopsy as an outpatient. Will consult radiation oncology.  4/9: Patient feels well overall other than straining to urinate. Informed by neurosurgery that he will be getting IR biopsy tomorrow. Following this, he will need to follow up with neurosurgery and radiation oncology outpatient.  aware, will seek resources in [place] as he does not have insurance.   4/10: Patient to go to procedure today. Will talk with social work regarding resources for the patient back in [place] for the uninsured.

## 2019-04-07 NOTE — PROGRESS NOTES
[facility] Hospital Medicine  Progress Note    Patient Name: Evaristo Griffith  MRN: [id number]  Patient Class: IP- Inpatient   Admission Date: 4/6/2019  Length of Stay: 1 days  Attending Physician: Susanna Pyle MD  Primary Care Provider: Primary Doctor [facility] Medicine Team: [facility] MED 5 Briseida Maza MD    Subjective:     Principal Problem:Lumbar spine tumor    HPI:  64 y/o male with essential HTN, CAD, ETOH abuse and prior CVA was admitted to [facility] early this am on 4/5 with bilateral lower extremity weakness for about 1 week. He also complained of loss of sensation in bilateral legs, and issues with constipation.  By the time he had arrived at the ED in [place] he states that most of his symptoms had began to resolve.  Patient noted on admit to have mild OMARI of Cr 1.4 with mild hyponatremia with sodium of 126. Patient given IVFs and a Banana bag and last Cr down to 1.0 and sodium improved to 132. Patient on admit had Hgb 7.1 but after IVFs has now dropped to 6.5. Patient in process of being transfused 1 unit of PBCs. Patient with no obvious active bleeding but was started on IV Protonix at 40 mg BID. Patient denies any melena or BRBPR, epistaxis or hematuria or hemoptysis. Patient also on labs noted to have normal AST, ALT, T. pete but elevated . Patient noted to have normal TSH, Vitamin B12 and folate levels. Patient noted on exam to have decreased strength in lower extremities 1/5 and decreased sensation but reflexes intact with some muscle wasting to lower extremities. Patient with no bladder or bowel incontinence. Rectal tone not checked. Due to leg weakness patient underwent MRI of brain that was unremarkable except for remote infarct but MRI of lumbar spine revealed spinal tumor at L4 with extraosseous extension causing compression to thecal sac at that level as well as widespread other osseous lesions of spine highly concerning for  metastatic cancer. Patient with no history of cancer. Dr. Briscoe from Neurosurgery was conferenced into call and he stated patient likely would need spinal decompression but asked for hospital medicine admit due to other medical needs such as anemia evaluation and help with coordination of care as patient has suspected metastatic cancer of unknown primary. Dr. Briscoe asked for Neurosurgery to be consulted. Patient was told results of MRI by transferring physician.         Hospital Course:  Patient denies any symptoms of weakness at this time. Denies loss of sensation. He is able to walk and control urination & bowel movements.     Past Medical History:   Diagnosis Date    Alcohol abuse     Coronary artery disease involving native coronary artery of native heart     Essential hypertension     Hyperlipidemia     Stroke        History reviewed. No pertinent surgical history.    Review of patient's allergies indicates:  No Known Allergies    No current facility-administered medications on file prior to encounter.      Current Outpatient Medications on File Prior to Encounter   Medication Sig    acetaminophen (TYLENOL) 500 MG tablet Take 500-1,000 mg by mouth daily as needed (back pain).    ASA/acetaminophen/caffeine/pot (GOODY'S HEADACHE POWDER ORAL) Take 2 Packages by mouth daily as needed (back pain).     Family History     None        Tobacco Use    Smoking status: Current Every Day Smoker     Packs/day: 1.00     Years: 55.00     Pack years: 55.00     Types: Cigarettes    Smokeless tobacco: Never Used   Substance and Sexual Activity    Alcohol use: Not on file    Drug use: Not on file    Sexual activity: Not on file     Review of Systems   Constitutional: Positive for activity change and appetite change. Negative for chills, fatigue and fever.   HENT: Negative for congestion and sinus pain.    Eyes: Negative for visual disturbance.   Respiratory: Negative for cough and shortness of breath.    Cardiovascular:  Negative for chest pain and palpitations.   Gastrointestinal: Negative for abdominal distention and abdominal pain.   Genitourinary: Positive for difficulty urinating.   Musculoskeletal: Positive for back pain and gait problem. Negative for arthralgias.   Skin: Negative for color change and pallor.   Neurological: Positive for weakness and numbness. Negative for dizziness, light-headedness and headaches.     Objective:     Vital Signs (Most Recent):  Temp: 96.7 °F (35.9 °C) (04/07/19 1126)  Pulse: 62 (04/07/19 1126)  Resp: 18 (04/07/19 1126)  BP: (!) 150/79 (04/07/19 1126)  SpO2: 97 % (04/07/19 1126) Vital Signs (24h Range):  Temp:  [96.7 °F (35.9 °C)-98.4 °F (36.9 °C)] 96.7 °F (35.9 °C)  Pulse:  [62-71] 62  Resp:  [15-18] 18  SpO2:  [96 %-100 %] 97 %  BP: (139-165)/(68-85) 150/79     Weight: 63.6 kg (140 lb 3.4 oz)  Body mass index is 19.02 kg/m².    Physical Exam   Constitutional: He is oriented to person, place, and time. He appears well-developed and well-nourished. No distress.   HENT:   Head: Normocephalic and atraumatic.   Eyes: Conjunctivae and EOM are normal.   Neck: Normal range of motion.   Cardiovascular: Normal rate, regular rhythm and normal heart sounds.   No murmur heard.  Pulmonary/Chest: Effort normal and breath sounds normal. No respiratory distress.   Abdominal: Soft. Bowel sounds are normal. He exhibits no distension. There is no tenderness.   Musculoskeletal: Normal range of motion. He exhibits no edema or deformity.   Neurological: He is alert and oriented to person, place, and time. No cranial nerve deficit or sensory deficit.   5/5 strength BUE and BLE  5/5 plantar and dorsiflexion   Skin: Skin is warm. Capillary refill takes less than 2 seconds.   Nursing note and vitals reviewed.        CRANIAL NERVES     CN III, IV, VI   Extraocular motions are normal.        Significant Labs:   Recent Lab Results       04/07/19 0521 04/07/19  0429        Albumin   2.1     Alkaline Phosphatase   478      ALT   23     Anion Gap   10     aPTT 30.6  Comment:  aPTT therapeutic range = 39-69 seconds       AST   43     Baso #   0.00     Basophil%   0.0     Total Bilirubin   0.3  Comment:  For infants and newborns, interpretation of results should be based  on gestational age, weight and in agreement with clinical  observations.  Premature Infant recommended reference ranges:  Up to 24 hours.............<8.0 mg/dL  Up to 48 hours............<12.0 mg/dL  3-5 days..................<15.0 mg/dL  6-29 days.................<15.0 mg/dL       BUN, Bld   21     Calcium   8.3     Chloride   103     CO2   18     Creatinine   0.8     Differential Method   Automated     eGFR if    >60.0     eGFR if non    >60.0  Comment:  Calculation used to obtain the estimated glomerular filtration  rate (eGFR) is the CKD-EPI equation.        Eos #   0.0     Eosinophil%   0.0     Glucose   186     Gran # (ANC)   7.6     Gran%   88.1     Group & Rh O POS       Hematocrit   24.8     Hemoglobin   7.5     Immature Grans (Abs)   0.06  Comment:  Mild elevation in immature granulocytes is non specific and   can be seen in a variety of conditions including stress response,   acute inflammation, trauma and pregnancy. Correlation with other   laboratory and clinical findings is essential.       Immature Granulocytes   0.7     INDIRECT GÓMEZ NEG       Coumadin Monitoring INR 1.1  Comment:  Coumadin Therapy:  2.0 - 3.0 for INR for all indicators except mechanical heart valves  and antiphospholipid syndromes which should use 2.5 - 3.5.         Lymph #   0.4     Lymph%   5.1     Magnesium   1.8     MCH   24.0     MCHC   30.2     MCV   80     Mono #   0.5     Mono%   6.1     MPV   10.4     nRBC   1     Phosphorus   2.6     Platelets   266     Potassium   4.4     Total Protein   6.6     Protime 11.5       RBC   3.12     RDW   25.2     Sodium   131     WBC   8.67           Significant Imaging: I have reviewed all pertinent imaging  results/findings within the past 24 hours.    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  Patient had BLE weakness for 3 days with associated constipation and loss of sensation in the LE but had resolved by the time he went to the ED.  At this time his strength and sensation has returned to full function.    63M PMH HTN, CAD, ETOH abuse and prior CVA presenting with 1 week of BLE weakness and numbness that has resolved. MRI reveals L4 mass and osseous lesions concerning for metastatic disease. Neurointact on my exam.     --  decadron 4mg q 6hr  -- he will require mass biopsy  -- NPO at [facility]   -- Obtaining CT chest/abdomen/pelvis metastatic workup.  -- Obtain CT lumbar spine without contrast.  -- Repeating MRI lumbar   -- Neurochecks q4h. Call NSGY with decline in exam.      Hyponatremia   Will check CMP and assess if fluid restriction is needed to return Na to wnl      Acute blood loss anemia  No obvious signs of bleeding.  Was intiailly 7.1 on presentation, received IVF and subsequent Hgb was 6.5.  Received 1u pRBC before transfer.  Monitoring CBCs and no signs of bleeding at the moment      Metastatic cancer to bone  63M PMH HTN, CAD, ETOH abuse and prior CVA presenting with 1 week of BLE weakness and numbness that has resolved. MRI reveals L4 mass and osseous lesions concerning for metastatic disease.      -- Obtain CT chest/abdomen/pelvis metastatic workup  -- PSA elevated 500s        Alcohol abuse        Coronary artery disease involving native coronary artery of native heart  Continuing ASA and plavix      Essential hypertension  Is on coreg 25 BID and norvasc 10 at home but reports only taking it when he has a headache. Held earlier for hypotension. Added back coreg      Hyperlipidemia          VTE Risk Mitigation (From admission, onward)        Ordered      enoxaparin injection 40 mg  Daily      04/06/19 0535     IP VTE HIGH RISK PATIENT  Once      04/06/19 0535              Briseida Maza MD  Department of Hospital Medicine   [facility]

## 2019-04-07 NOTE — ASSESSMENT & PLAN NOTE
No obvious signs of bleeding.  Was intiailly 7.1 on presentation, received IVF and subsequent Hgb was 6.5.  Received 1u pRBC before transfer.  Monitoring CBCs and no signs of bleeding at the moment

## 2019-04-07 NOTE — CONSULTS
[facility]  Neurosurgery  Consult Note    Consults  Subjective:     Chief Complaint/Reason for Admission: Lumbar mass    History of Present Illness: Patient is a 63 year old male with a history of HTN, CAD, ETOH abuse and prior CVA who complained of BLE weakness of about 1 week. He also complained of loss of sensation in bilateral legs, and issues with constipation. Denies bowel or bladder incontinence. His symptoms have since resolved. Due to leg weakness patient underwent MRI of brain that was unremarkable except for remote infarct but MRI of lumbar spine revealed spinal tumor at L4 with extraosseous extension causing compression to thecal sac at that level as well as widespread other osseous lesions of spine highly concerning for metastatic cancer. Patient with no history of cancer. Patient is a current smoker. Has no other complaints.          No medications prior to admission.       Review of patient's allergies indicates:  No Known Allergies    Past Medical History:   Diagnosis Date    Alcohol abuse     Coronary artery disease involving native coronary artery of native heart     Essential hypertension     Hyperlipidemia     Stroke      History reviewed. No pertinent surgical history.  Family History     None        Tobacco Use    Smoking status: Current Every Day Smoker     Packs/day: 1.00     Years: 55.00     Pack years: 55.00     Types: Cigarettes    Smokeless tobacco: Never Used   Substance and Sexual Activity    Alcohol use: Not on file    Drug use: Not on file    Sexual activity: Not on file     Review of Systems   Constitutional: Negative for chills and fever.   HENT: Negative for nosebleeds and rhinorrhea.    Eyes: Negative for photophobia and visual disturbance.   Respiratory: Negative for chest tightness and shortness of breath.    Cardiovascular: Negative for chest pain and palpitations.   Gastrointestinal: Positive for constipation. Negative for abdominal pain, nausea  and vomiting.   Genitourinary: Negative for dysuria.   Musculoskeletal: Negative for back pain and neck pain.   Neurological: Positive for weakness and numbness. Negative for dizziness, syncope and headaches.   Psychiatric/Behavioral: Negative for behavioral problems and confusion.     Objective:     Weight: 63.6 kg (140 lb 3.4 oz)  Body mass index is 19.02 kg/m².  Vital Signs (Most Recent):  Temp: 98 °F (36.7 °C) (04/07/19 0017)  Pulse: 69 (04/07/19 0017)  Resp: 18 (04/07/19 0017)  BP: 139/68 (04/07/19 0017)  SpO2: 96 % (04/07/19 0017) Vital Signs (24h Range):  Temp:  [96.3 °F (35.7 °C)-98 °F (36.7 °C)] 98 °F (36.7 °C)  Pulse:  [66-72] 69  Resp:  [15-20] 18  SpO2:  [96 %-100 %] 96 %  BP: (139-154)/(68-81) 139/68       Neurosurgery Physical Exam    General: well developed, well nourished, no distress.   Head: normocephalic, atraumatic  Neurologic: Alert and oriented. Thought content appropriate.  GCS: Motor: 6/Verbal: 5/Eyes: 4 GCS Total: 15  Mental Status: Awake, Alert, Oriented x 4  Language: No aphasia  Speech: No dysarthria  Cranial nerves: face symmetric, tongue midline, CN II-XII grossly intact.   Eyes: pupils equal, round, reactive to light with accomodation, EOMI.  Pulmonary: normal respirations, no signs of respiratory distress  Abdomen: soft, non-distended  Sensory: intact to light touch throughout  Motor Strength: Moves all extremities spontaneously with good tone.  Full strength upper and lower extremities. No abnormal movements seen.     Strength  Deltoids Triceps Biceps Wrist Extension Wrist Flexion Hand    Upper: R 5/5 5/5 5/5 5/5 5/5 5/5    L 5/5 5/5 5/5 5/5 5/5 5/5     Iliopsoas Quadriceps Knee  Flexion Tibialis  anterior Gastro- cnemius EHL   Lower: R 5/5 5/5 5/5 5/5 5/5 5/5    L 5/5 5/5 5/5 5/5 5/5 5/5     DTR's - 2 + and symmetric in UE and LE  Pronator Drift: no drift noted  Finger-to-nose: Intact bilaterally  Bah: absent  Clonus: absent  Pulses: 2+ and symmetric radial and dorsalis  pedis.  Skin: Skin is warm, dry and intact.        Significant Labs:  Recent Labs   Lab 04/06/19  0546   *   *   K 4.4      CO2 19*   BUN 21   CREATININE 0.8   CALCIUM 8.8   MG 2.1     Recent Labs   Lab 04/06/19  0546   WBC 8.35   HGB 8.1*   HCT 26.8*        No results for input(s): LABPT, INR, APTT in the last 48 hours.  Microbiology Results (last 7 days)     ** No results found for the last 168 hours. **        All pertinent labs from the last 24 hours have been reviewed.    Significant Diagnostics:  I have reviewed all pertinent imaging results/findings within the past 24 hours.    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 MRI reveals L4 mass and osseous lesions concerning for metastatic disease. Neurointact on my exam.    -- No acute neurosurgical intervention necessary.   -- Possible surgery tomorrow depending on new imaging.  -- OK for diet now, NPO at [place].  -- Obtain CT chest/abdomen/pelvis metastatic workup.  -- Obtain CT lumbar spine without contrast.  -- Obtain repeat MRI lumbar with contrast, prior study very poor quality.  -- Dex 4q6  -- Neurochecks q4h. Call NSGY with decline in exam.  -- Please measure post void residuals and record in epic notes.  -- Further care per primary team.   -- Further recs to follow pending attending review.        Thank you for your consult. I will follow-up with patient. Please contact us if you have any additional questions.    Santiago Benavidez MD  Neurosurgery  [facility]

## 2019-04-08 LAB
ALBUMIN SERPL BCP-MCNC: 2.2 G/DL (ref 3.5–5.2)
ALBUMIN SERPL ELPH-MCNC: 2.21 G/DL (ref 3.35–5.55)
ALP SERPL-CCNC: 480 U/L (ref 55–135)
ALPHA1 GLOB SERPL ELPH-MCNC: 0.62 G/DL (ref 0.17–0.41)
ALPHA2 GLOB SERPL ELPH-MCNC: 0.95 G/DL (ref 0.43–0.99)
ALT SERPL W/O P-5'-P-CCNC: 32 U/L (ref 10–44)
ANION GAP SERPL CALC-SCNC: 9 MMOL/L (ref 8–16)
AST SERPL-CCNC: 46 U/L (ref 10–40)
B-GLOBULIN SERPL ELPH-MCNC: 0.84 G/DL (ref 0.5–1.1)
BASOPHILS # BLD AUTO: 0 K/UL (ref 0–0.2)
BASOPHILS NFR BLD: 0 % (ref 0–1.9)
BILIRUB SERPL-MCNC: 0.2 MG/DL (ref 0.1–1)
BUN SERPL-MCNC: 20 MG/DL (ref 8–23)
CALCIUM SERPL-MCNC: 8.2 MG/DL (ref 8.7–10.5)
CHLORIDE SERPL-SCNC: 104 MMOL/L (ref 95–110)
CO2 SERPL-SCNC: 19 MMOL/L (ref 23–29)
CREAT SERPL-MCNC: 0.7 MG/DL (ref 0.5–1.4)
DIFFERENTIAL METHOD: ABNORMAL
EOSINOPHIL # BLD AUTO: 0 K/UL (ref 0–0.5)
EOSINOPHIL NFR BLD: 0 % (ref 0–8)
ERYTHROCYTE [DISTWIDTH] IN BLOOD BY AUTOMATED COUNT: 25.1 % (ref 11.5–14.5)
EST. GFR  (AFRICAN AMERICAN): >60 ML/MIN/1.73 M^2
EST. GFR  (NON AFRICAN AMERICAN): >60 ML/MIN/1.73 M^2
GAMMA GLOB SERPL ELPH-MCNC: 0.99 G/DL (ref 0.67–1.58)
GLUCOSE SERPL-MCNC: 127 MG/DL (ref 70–110)
HCT VFR BLD AUTO: 25.7 % (ref 40–54)
HGB BLD-MCNC: 7.6 G/DL (ref 14–18)
IMM GRANULOCYTES # BLD AUTO: 0.1 K/UL (ref 0–0.04)
IMM GRANULOCYTES NFR BLD AUTO: 1.3 % (ref 0–0.5)
KAPPA LC SER QL IA: 2.96 MG/DL (ref 0.33–1.94)
KAPPA LC/LAMBDA SER IA: 1.5 (ref 0.26–1.65)
LAMBDA LC SER QL IA: 1.97 MG/DL (ref 0.57–2.63)
LYMPHOCYTES # BLD AUTO: 0.5 K/UL (ref 1–4.8)
LYMPHOCYTES NFR BLD: 6.7 % (ref 18–48)
MAGNESIUM SERPL-MCNC: 1.8 MG/DL (ref 1.6–2.6)
MCH RBC QN AUTO: 24 PG (ref 27–31)
MCHC RBC AUTO-ENTMCNC: 29.6 G/DL (ref 32–36)
MCV RBC AUTO: 81 FL (ref 82–98)
MONOCYTES # BLD AUTO: 0.9 K/UL (ref 0.3–1)
MONOCYTES NFR BLD: 11.1 % (ref 4–15)
NEUTROPHILS # BLD AUTO: 6.2 K/UL (ref 1.8–7.7)
NEUTROPHILS NFR BLD: 80.9 % (ref 38–73)
NRBC BLD-RTO: 2 /100 WBC
PHOSPHATE SERPL-MCNC: 2.7 MG/DL (ref 2.7–4.5)
PLATELET # BLD AUTO: 303 K/UL (ref 150–350)
PMV BLD AUTO: 10.5 FL (ref 9.2–12.9)
POTASSIUM SERPL-SCNC: 4.6 MMOL/L (ref 3.5–5.1)
PROT 24H UR-MRATE: NORMAL MG/SPEC (ref 0–100)
PROT SERPL-MCNC: 5.6 G/DL (ref 6–8.4)
PROT SERPL-MCNC: 6.3 G/DL (ref 6–8.4)
PROT UR-MCNC: <7 MG/DL (ref 0–15)
RBC # BLD AUTO: 3.17 M/UL (ref 4.6–6.2)
SODIUM SERPL-SCNC: 132 MMOL/L (ref 136–145)
URINE COLLECTION DURATION: 24 HR
URINE VOLUME: 1800 ML
WBC # BLD AUTO: 7.64 K/UL (ref 3.9–12.7)

## 2019-04-08 PROCEDURE — 83735 ASSAY OF MAGNESIUM: CPT

## 2019-04-08 PROCEDURE — 36415 COLL VENOUS BLD VENIPUNCTURE: CPT

## 2019-04-08 PROCEDURE — A9585 GADOBUTROL INJECTION: HCPCS | Performed by: HOSPITALIST

## 2019-04-08 PROCEDURE — 63600175 PHARM REV CODE 636 W HCPCS: Performed by: STUDENT IN AN ORGANIZED HEALTH CARE EDUCATION/TRAINING PROGRAM

## 2019-04-08 PROCEDURE — 84100 ASSAY OF PHOSPHORUS: CPT

## 2019-04-08 PROCEDURE — 25000003 PHARM REV CODE 250: Performed by: STUDENT IN AN ORGANIZED HEALTH CARE EDUCATION/TRAINING PROGRAM

## 2019-04-08 PROCEDURE — 99232 PR SUBSEQUENT HOSPITAL CARE,LEVL II: ICD-10-PCS | Mod: ,,, | Performed by: HOSPITALIST

## 2019-04-08 PROCEDURE — 99232 SBSQ HOSP IP/OBS MODERATE 35: CPT | Mod: ,,, | Performed by: HOSPITALIST

## 2019-04-08 PROCEDURE — 97161 PT EVAL LOW COMPLEX 20 MIN: CPT

## 2019-04-08 PROCEDURE — 80053 COMPREHEN METABOLIC PANEL: CPT

## 2019-04-08 PROCEDURE — 97165 OT EVAL LOW COMPLEX 30 MIN: CPT

## 2019-04-08 PROCEDURE — 84166 PROTEIN E-PHORESIS/URINE/CSF: CPT

## 2019-04-08 PROCEDURE — 25500020 PHARM REV CODE 255: Performed by: HOSPITALIST

## 2019-04-08 PROCEDURE — 11000001 HC ACUTE MED/SURG PRIVATE ROOM

## 2019-04-08 PROCEDURE — 84156 ASSAY OF PROTEIN URINE: CPT

## 2019-04-08 PROCEDURE — 84166 PROTEIN E-PHORESIS/URINE/CSF: CPT | Mod: 26,,, | Performed by: PATHOLOGY

## 2019-04-08 PROCEDURE — 85025 COMPLETE CBC W/AUTO DIFF WBC: CPT

## 2019-04-08 PROCEDURE — 84166 PATHOLOGIST INTERPRETATION UPE: ICD-10-PCS | Mod: 26,,, | Performed by: PATHOLOGY

## 2019-04-08 RX ADMIN — DEXAMETHASONE SODIUM PHOSPHATE 4 MG: 4 INJECTION, SOLUTION INTRA-ARTICULAR; INTRALESIONAL; INTRAMUSCULAR; INTRAVENOUS; SOFT TISSUE at 11:04

## 2019-04-08 RX ADMIN — DEXAMETHASONE SODIUM PHOSPHATE 4 MG: 4 INJECTION, SOLUTION INTRA-ARTICULAR; INTRALESIONAL; INTRAMUSCULAR; INTRAVENOUS; SOFT TISSUE at 05:04

## 2019-04-08 RX ADMIN — CARVEDILOL 12.5 MG: 12.5 TABLET, FILM COATED ORAL at 10:04

## 2019-04-08 RX ADMIN — PANTOPRAZOLE SODIUM 40 MG: 40 TABLET, DELAYED RELEASE ORAL at 10:04

## 2019-04-08 RX ADMIN — CARVEDILOL 12.5 MG: 12.5 TABLET, FILM COATED ORAL at 08:04

## 2019-04-08 RX ADMIN — ENOXAPARIN SODIUM 40 MG: 100 INJECTION SUBCUTANEOUS at 05:04

## 2019-04-08 NOTE — SUBJECTIVE & OBJECTIVE
Interval History: NAEON. Denies weakness, LE paresthesias, or b/b dysfunction.    Medications:  Continuous Infusions:  Scheduled Meds:   carvedilol  12.5 mg Oral BID    dexamethasone (DECADRON) IVPB  4 mg Intravenous Q6H    enoxparin  40 mg Subcutaneous Daily    pantoprazole  40 mg Oral Daily     PRN Meds:acetaminophen, dextrose 50%, dextrose 50%, glucagon (human recombinant), glucose, glucose, omnipaque, sodium chloride 0.9%     Review of Systems  Objective:     Weight: 63.6 kg (140 lb 3.4 oz)  Body mass index is 19.02 kg/m².  Vital Signs (Most Recent):  Temp: 97.5 °F (36.4 °C) (04/08/19 0744)  Pulse: 72 (04/08/19 0744)  Resp: 20 (04/08/19 0744)  BP: (!) 172/89 (04/08/19 0744)  SpO2: 98 % (04/08/19 0744) Vital Signs (24h Range):  Temp:  [97.5 °F (36.4 °C)-97.7 °F (36.5 °C)] 97.5 °F (36.4 °C)  Pulse:  [64-75] 72  Resp:  [16-20] 20  SpO2:  [95 %-99 %] 98 %  BP: (154-172)/(74-89) 172/89                          Neurosurgery Physical Exam  General: well developed, well nourished, no distress.   Head: normocephalic, atraumatic  Neurologic: Alert and oriented. Thought content appropriate.  GCS: Motor: 6/Verbal: 5/Eyes: 4 GCS Total: 15  Mental Status: Awake, Alert, Oriented x 4  Language: No aphasia  Speech: No dysarthria  Cranial nerves: face symmetric, tongue midline, CN II-XII grossly intact.   Eyes: pupils equal, round, reactive to light with accomodation, EOMI.  Pulmonary: normal respirations, no signs of respiratory distress  Abdomen: soft, non-distended  Sensory: intact to light touch throughout  Motor Strength: Moves all extremities spontaneously with good tone.  Full strength upper and lower extremities. No abnormal movements seen.      Strength   Deltoids Triceps Biceps Wrist Extension Wrist Flexion Hand    Upper: R 5/5 5/5 5/5 5/5 5/5 5/5     L 5/5 5/5 5/5 5/5 5/5 5/5       Iliopsoas Quadriceps Knee  Flexion Tibialis  anterior Gastro- cnemius EHL   Lower: R 5/5 5/5 5/5 5/5 5/5 5/5     L 5/5 5/5 5/5 5/5  5/5 5/5      DTR's - 2 + and symmetric in UE and LE  Pronator Drift: no drift noted  Finger-to-nose: Intact bilaterally  Bah: absent  Clonus: absent  Pulses: 2+ and symmetric radial and dorsalis pedis.  Skin: Skin is warm, dry and intact.    Significant Labs:  Recent Labs   Lab 04/07/19 0429 04/08/19  0331   * 127*   * 132*   K 4.4 4.6    104   CO2 18* 19*   BUN 21 20   CREATININE 0.8 0.7   CALCIUM 8.3* 8.2*   MG 1.8 1.8     Recent Labs   Lab 04/07/19 0429 04/08/19  0331   WBC 8.67 7.64   HGB 7.5* 7.6*   HCT 24.8* 25.7*    303     Recent Labs   Lab 04/07/19  0521   INR 1.1   APTT 30.6     Microbiology Results (last 7 days)     ** No results found for the last 168 hours. **          Significant Diagnostics:  Standing xrays pending.

## 2019-04-08 NOTE — PHARMACY MED REC
"Admission Medication Reconciliation - Pharmacy Consult Note    The home medication history was taken by Hanna Hinson Pharmacy Tech.  Based on information gathered and subsequent review by the clinical pharmacist, the items below may need attention.    You may go to "Admission" then "Reconcile Home Medications" tabs to review and/or act upon these items.        No issues noted with the medication reconciliation.      Please address this information as you see fit.  Feel free to contact us if you have any questions or require assistance.    Valentina Pace, CorrineD  [phone]                  .    .          "

## 2019-04-08 NOTE — PT/OT/SLP EVAL
"Physical Therapy Evaluation and Discharge Note    Patient Name:  Evaristo Griffith   MRN:  [id number]    Recommendations:     Discharge Recommendations:  home   Discharge Equipment Recommendations: none   Barriers to discharge: None    Assessment:     Evaristo Griffith is a 63 y.o. male admitted with a medical diagnosis of Lumbar spine tumor. .  At this time, patient is functioning at their prior level of function and does not require further acute PT services.     Recent Surgery: * No surgery found *      Plan:     During this hospitalization, patient does not require further acute PT services.  Please re-consult if situation changes.      Subjective     Chief Complaint: none noted   Patient/Family Comments/goals: "Everything is good" in response to therapy can provide any DME or services   Pain/Comfort:  ·      Patients cultural, spiritual, Congregational conflicts given the current situation: no    Living Environment:  Pt lives with uncle in [facility] with 3 LIOR and no handrails  Prior to admission, patients level of function was Ind.  Equipment used at home: none.  DME owned (not currently used): none.  Upon discharge, patient will have assistance from uncle as needed.    Objective:     Communicated with RN prior to session.  Patient found supine with   upon PT entry to room.    General Precautions: Standard, fall   Orthopedic Precautions:N/A   Braces: N/A     Exams:  · Gross Motor Coordination:  WFL  · RLE ROM: WFL  · RLE Strength: WFL  · LLE ROM: WFL  · LLE Strength: WFL    Functional Mobility:  · Bed Mobility:     · Rolling Right: independence  · Scooting: independence  · Supine to Sit: independence  · Sit to Supine: independence  · Transfers:     · Sit to Stand:  independence with no AD  · Gait: pt amb 20ft with Mod I with increased time but demonstrating appropriate safety awareness to amb without assist. pt amb 20ft but demonstrated ability to amb increased distances, pt deffering on this date.   · Balance: Sitting: Ind      " Standing: Ind     AM-PAC 6 CLICK MOBILITY  Total Score:24       Therapeutic Activities and Exercises:   Pt educated on:   -PT roles, expectations, and POC    -Safety with mobility   -Benefits of OOB activities to increase strength and functional mobility    -Discharge recommendations     AM-PAC 6 CLICK MOBILITY  Total Score:24     Patient left supine with all lines intact and call button in reach.    GOALS:   Multidisciplinary Problems     Physical Therapy Goals     Not on file          Multidisciplinary Problems (Resolved)        Problem: Physical Therapy Goal    Goal Priority Disciplines Outcome Goal Variances Interventions   Physical Therapy Goal   (Resolved)     PT, PT/OT Outcome(s) achieved                     History:     Past Medical History:   Diagnosis Date    Alcohol abuse     Coronary artery disease involving native coronary artery of native heart     Essential hypertension     Hyperlipidemia     Stroke        History reviewed. No pertinent surgical history.    Time Tracking:     PT Received On: 04/08/19  PT Start Time: 0836     PT Stop Time: 0844  PT Total Time (min): 8 min     Billable Minutes: Evaluation 8      Nawaf Brown PT  04/08/2019

## 2019-04-08 NOTE — ASSESSMENT & PLAN NOTE
MRI at outside hospital revealed an L4 spinal tumor without numerous other lesions throughout the spine that is concerning for metastatic disease.  Patient had BLE weakness for 3 days with associated constipation and loss of sensation in the LE but had resolved by the time he went to the ED.  At this time his strength and sensation has returned to full function.    63M PMH HTN, CAD, ETOH abuse and prior CVA presenting with 1 week of BLE weakness and numbness that has resolved. MRI reveals L4 mass and osseous lesions concerning for metastatic disease. Neurointact on my exam.     -- decadron 4mg q 6hr  -- he will require mass biopsy  -- Obtaining CT chest/abdomen/pelvis metastatic workup.  -- Obtain CT lumbar spine without contrast.  -- Repeating MRI lumbar   -- Neurochecks q4h. Call NSGY with decline in exam.

## 2019-04-08 NOTE — ASSESSMENT & PLAN NOTE
63M PMH HTN, CAD, ETOH abuse and prior CVA presenting with 1 week of BLE weakness and numbness that has resolved. MRI reveals L4 mass and osseous lesions concerning for metastatic disease. Neurointact on my exam.    -- No acute neurosurgical intervention necessary.   -- Recommend IR bone bx for diagnosis. Pathology will determine any need for surgical intervention prior to radtx.  -- IR bone biopsy schedule for Wednesday while inpatient. Please make NPO Tuesday at [place].  -- CT chest/abdomen/pelvis with concern for prostate primary lesion.  -- Obtain standing xray lumbar spine.  -- Dex 4q6  -- Neurochecks q4h. Call NSGY with decline in exam.  -- Please measure post void residuals.  -- Further care per primary team.

## 2019-04-08 NOTE — PLAN OF CARE
Problem: Physical Therapy Goal  Goal: Physical Therapy Goal  Outcome: Outcome(s) achieved Date Met: 04/08/19  No goals set, pt does not require additional acute PT services at this time due to baseline functional mobility.     Pt educated on asking medical staff for PT consult if changes in functional status occurs.     - Freeman Brown, PT, DPT  4/8/2019

## 2019-04-08 NOTE — PROGRESS NOTES
[facility]  Neurosurgery  Progress Note    Subjective:     History of Present Illness: Patient is a 63 year old male with a history of HTN, CAD, ETOH abuse and prior CVA who complained of BLE weakness of about 1 week. He also complained of loss of sensation in bilateral legs, and issues with constipation. Denies bowel or bladder incontinence. His symptoms have since resolved. Due to leg weakness patient underwent MRI of brain that was unremarkable except for remote infarct but MRI of lumbar spine revealed spinal tumor at L4 with extraosseous extension causing compression to thecal sac at that level as well as widespread other osseous lesions of spine highly concerning for metastatic cancer. Patient with no history of cancer. Patient is a current smoker. Has no other complaints.          Post-Op Info:  * No surgery found *         Interval History: NAEON. Denies weakness, LE paresthesias, or b/b dysfunction.    Medications:  Continuous Infusions:  Scheduled Meds:   carvedilol  12.5 mg Oral BID    dexamethasone (DECADRON) IVPB  4 mg Intravenous Q6H    enoxparin  40 mg Subcutaneous Daily    pantoprazole  40 mg Oral Daily     PRN Meds:acetaminophen, dextrose 50%, dextrose 50%, glucagon (human recombinant), glucose, glucose, omnipaque, sodium chloride 0.9%     Review of Systems  Objective:     Weight: 63.6 kg (140 lb 3.4 oz)  Body mass index is 19.02 kg/m².  Vital Signs (Most Recent):  Temp: 97.5 °F (36.4 °C) (04/08/19 0744)  Pulse: 72 (04/08/19 0744)  Resp: 20 (04/08/19 0744)  BP: (!) 172/89 (04/08/19 0744)  SpO2: 98 % (04/08/19 0744) Vital Signs (24h Range):  Temp:  [97.5 °F (36.4 °C)-97.7 °F (36.5 °C)] 97.5 °F (36.4 °C)  Pulse:  [64-75] 72  Resp:  [16-20] 20  SpO2:  [95 %-99 %] 98 %  BP: (154-172)/(74-89) 172/89                          Neurosurgery Physical Exam  General: well developed, well nourished, no distress.   Head: normocephalic, atraumatic  Neurologic: Alert and oriented. Thought  content appropriate.  GCS: Motor: 6/Verbal: 5/Eyes: 4 GCS Total: 15  Mental Status: Awake, Alert, Oriented x 4  Language: No aphasia  Speech: No dysarthria  Cranial nerves: face symmetric, tongue midline, CN II-XII grossly intact.   Eyes: pupils equal, round, reactive to light with accomodation, EOMI.  Pulmonary: normal respirations, no signs of respiratory distress  Abdomen: soft, non-distended  Sensory: intact to light touch throughout  Motor Strength: Moves all extremities spontaneously with good tone.  Full strength upper and lower extremities. No abnormal movements seen.      Strength   Deltoids Triceps Biceps Wrist Extension Wrist Flexion Hand    Upper: R 5/5 5/5 5/5 5/5 5/5 5/5     L 5/5 5/5 5/5 5/5 5/5 5/5       Iliopsoas Quadriceps Knee  Flexion Tibialis  anterior Gastro- cnemius EHL   Lower: R 5/5 5/5 5/5 5/5 5/5 5/5     L 5/5 5/5 5/5 5/5 5/5 5/5      DTR's - 2 + and symmetric in UE and LE  Pronator Drift: no drift noted  Finger-to-nose: Intact bilaterally  Bah: absent  Clonus: absent  Pulses: 2+ and symmetric radial and dorsalis pedis.  Skin: Skin is warm, dry and intact.    Significant Labs:  Recent Labs   Lab 04/07/19 0429 04/08/19  0331   * 127*   * 132*   K 4.4 4.6    104   CO2 18* 19*   BUN 21 20   CREATININE 0.8 0.7   CALCIUM 8.3* 8.2*   MG 1.8 1.8     Recent Labs   Lab 04/07/19  0429 04/08/19  0331   WBC 8.67 7.64   HGB 7.5* 7.6*   HCT 24.8* 25.7*    303     Recent Labs   Lab 04/07/19  0521   INR 1.1   APTT 30.6     Microbiology Results (last 7 days)     ** No results found for the last 168 hours. **          Significant Diagnostics:  Standing xrays pending.    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 MRI reveals L4 mass and osseous lesions concerning for metastatic disease. Neurointact on my exam.    -- No acute neurosurgical intervention  necessary.   -- Recommend IR bone bx for diagnosis. Pathology will determine any need for surgical intervention prior to radtx.  -- IR bone biopsy schedule for Wednesday while inpatient. Please make NPO Tuesday at [place].  -- CT chest/abdomen/pelvis with concern for prostate primary lesion.  -- Obtain standing xray lumbar spine.  -- Dex 4q6  -- Neurochecks q4h. Call NSGY with decline in exam.  -- Please measure post void residuals.  -- Further care per primary team.       Mary Soni PA-C  Neurosurgery  [facility]

## 2019-04-08 NOTE — CONSULTS
Reviewed case and imaging with staff. Recommend scheduling vertebral body bone biopsy as an outpatient. Please call with any questions.    Arie Quinn M.D.  PGY-3 Radiology

## 2019-04-08 NOTE — SUBJECTIVE & OBJECTIVE
Interval History: No acute events overnight, patient reports some difficulty urinating.     Review of Systems   Constitutional: Positive for activity change and appetite change. Negative for chills, fatigue and fever.   HENT: Negative for congestion and sinus pain.    Eyes: Negative for visual disturbance.   Respiratory: Negative for cough and shortness of breath.    Cardiovascular: Negative for chest pain and palpitations.   Gastrointestinal: Negative for abdominal distention and abdominal pain.   Genitourinary: Positive for difficulty urinating.   Musculoskeletal: Positive for back pain and gait problem (improved). Negative for arthralgias.   Skin: Negative for color change and pallor.   Neurological: Positive for weakness and numbness. Negative for dizziness, light-headedness and headaches.    Objective:     Vital Signs (Most Recent):  Temp: 97.5 °F (36.4 °C) (04/08/19 0501)  Pulse: 64 (04/08/19 0501)  Resp: 18 (04/08/19 0501)  BP: (!) 169/82 (04/08/19 0501)  SpO2: 98 % (04/08/19 0501) Vital Signs (24h Range):  Temp:  [96.7 °F (35.9 °C)-97.8 °F (36.6 °C)] 97.5 °F (36.4 °C)  Pulse:  [62-75] 64  Resp:  [16-18] 18  SpO2:  [95 %-99 %] 98 %  BP: (150-169)/(74-85) 169/82     Weight: 63.6 kg (140 lb 3.4 oz)  Body mass index is 19.02 kg/m².    Intake/Output Summary (Last 24 hours) at 4/8/2019 0719  Last data filed at 4/8/2019 0506  Gross per 24 hour   Intake 830 ml   Output 1425 ml   Net -595 ml      Physical Exam   Constitutional: He is oriented to person, place, and time. He appears well-developed and well-nourished. No distress.   HENT:   Head: Normocephalic and atraumatic.   Eyes: Conjunctivae and EOM are normal.   Neck: Normal range of motion.   Cardiovascular: Normal rate, regular rhythm and normal heart sounds.   No murmur heard.  Pulmonary/Chest: Effort normal and breath sounds normal. No respiratory distress.   Abdominal: Soft. Bowel sounds are normal. He exhibits no distension. There is no tenderness.    Musculoskeletal: Normal range of motion. He exhibits no edema or deformity.   Neurological: He is alert and oriented to person, place, and time. No cranial nerve deficit or sensory deficit.   5/5 strength BUE and BLE  5/5 plantar and dorsiflexion   Skin: Skin is warm. Capillary refill takes less than 2 seconds.   Nursing note and vitals reviewed.           CRANIAL NERVES      CN III, IV, VI   Extraocular motions are normal.       Significant Labs: All pertinent labs within the past 24 hours have been reviewed.    Significant Imaging: I have reviewed all pertinent imaging results/findings within the past 24 hours.

## 2019-04-08 NOTE — PLAN OF CARE
MONTRELL OHARA     04/08/19 1056   Discharge Assessment   Assessment Type Discharge Planning Assessment   Confirmed/corrected address and phone number on facesheet? Yes   Expected Length of Stay (days) 3   Communicated expected length of stay with patient/caregiver no   Current cognitive status: Not Oriented to Person;Not Oriented to Place;Not Oriented to Time   Current Functional Status: Needs Assistance   Lives With alone    [phone]

## 2019-04-08 NOTE — PROGRESS NOTES
[facility] Hospital Medicine  Progress Note    Patient Name: Evaristo Griffith  MRN: [id number]  Patient Class: IP- Inpatient   Admission Date: 4/6/2019  Length of Stay: 2 days  Attending Physician: Susanna Pyle MD  Primary Care Provider: Primary [place] [facility] Team: [facility] HOSP MED 5 Paras Izaguirre MD    Subjective:     Principal Problem:Lumbar spine tumor    HPI:  64 y/o male with essential HTN, CAD, ETOH abuse and prior CVA was admitted to [facility] [place] [facility] early this am on 4/5 with bilateral lower extremity weakness for about 1 week. He also complained of loss of sensation in bilateral legs, and issues with constipation.  By the time he had arrived at the ED in [place] he states that most of his symptoms had began to resolve.  Patient noted on admit to have mild OMARI of Cr 1.4 with mild hyponatremia with sodium of 126. Patient given IVFs and a Banana bag and last Cr down to 1.0 and sodium improved to 132. Patient on admit had Hgb 7.1 but after IVFs has now dropped to 6.5. Patient in process of being transfused 1 unit of PBCs. Patient with no obvious active bleeding but was started on IV Protonix at 40 mg BID. Patient denies any melena or BRBPR, epistaxis or hematuria or hemoptysis. Patient also on labs noted to have normal AST, ALT, T. pete but elevated . Patient noted to have normal TSH, Vitamin B12 and folate levels. Patient noted on exam to have decreased strength in lower extremities 1/5 and decreased sensation but reflexes intact with some muscle wasting to lower extremities. Patient with no bladder or bowel incontinence. Rectal tone not checked. Due to leg weakness patient underwent MRI of brain that was unremarkable except for remote infarct but MRI of lumbar spine revealed spinal tumor at L4 with extraosseous extension causing compression to thecal sac at that level as well as widespread other osseous lesions of spine highly concerning for  metastatic cancer. Patient with no history of cancer. Dr. Briscoe from Neurosurgery was conferenced into call and he stated patient likely would need spinal decompression but asked for hospital medicine admit due to other medical needs such as anemia evaluation and help with coordination of care as patient has suspected metastatic cancer of unknown primary. Dr. Briscoe asked for Neurosurgery to be consulted. Patient was told results of MRI by transferring physician.         Hospital Course:  Patient denies any symptoms of weakness at this time. Denies loss of sensation. He is able to walk and control urination & bowel movements. 4/8, patient reports feeling well over all although he is still straining occasionally to urinate. Neurosurgery has evaluated him, no acute intervention at this time, and they have recommended consult to radiation oncology. Interventional radiology has recommended vertebral bone biopsy as an outpatient. Will consult radiation oncology.    Interval History: No acute events overnight, patient reports some difficulty urinating.     Review of Systems   Constitutional: Positive for activity change and appetite change. Negative for chills, fatigue and fever.   HENT: Negative for congestion and sinus pain.    Eyes: Negative for visual disturbance.   Respiratory: Negative for cough and shortness of breath.    Cardiovascular: Negative for chest pain and palpitations.   Gastrointestinal: Negative for abdominal distention and abdominal pain.   Genitourinary: Positive for difficulty urinating.   Musculoskeletal: Positive for back pain and gait problem (improved). Negative for arthralgias.   Skin: Negative for color change and pallor.   Neurological: Positive for weakness and numbness. Negative for dizziness, light-headedness and headaches.    Objective:     Vital Signs (Most Recent):  Temp: 97.5 °F (36.4 °C) (04/08/19 0501)  Pulse: 64 (04/08/19 0501)  Resp: 18 (04/08/19 0501)  BP: (!) 169/82 (04/08/19  0501)  SpO2: 98 % (04/08/19 0501) Vital Signs (24h Range):  Temp:  [96.7 °F (35.9 °C)-97.8 °F (36.6 °C)] 97.5 °F (36.4 °C)  Pulse:  [62-75] 64  Resp:  [16-18] 18  SpO2:  [95 %-99 %] 98 %  BP: (150-169)/(74-85) 169/82     Weight: 63.6 kg (140 lb 3.4 oz)  Body mass index is 19.02 kg/m².    Intake/Output Summary (Last 24 hours) at 4/8/2019 0719  Last data filed at 4/8/2019 0506  Gross per 24 hour   Intake 830 ml   Output 1425 ml   Net -595 ml      Physical Exam   Constitutional: He is oriented to person, place, and time. He appears well-developed and well-nourished. No distress.   HENT:   Head: Normocephalic and atraumatic.   Eyes: Conjunctivae and EOM are normal.   Neck: Normal range of motion.   Cardiovascular: Normal rate, regular rhythm and normal heart sounds.   No murmur heard.  Pulmonary/Chest: Effort normal and breath sounds normal. No respiratory distress.   Abdominal: Soft. Bowel sounds are normal. He exhibits no distension. There is no tenderness.   Musculoskeletal: Normal range of motion. He exhibits no edema or deformity.   Neurological: He is alert and oriented to person, place, and time. No cranial nerve deficit or sensory deficit.   5/5 strength BUE and BLE  5/5 plantar and dorsiflexion   Skin: Skin is warm. Capillary refill takes less than 2 seconds.   Nursing note and vitals reviewed.           CRANIAL NERVES      CN III, IV, VI   Extraocular motions are normal.       Significant Labs: All pertinent labs within the past 24 hours have been reviewed.    Significant Imaging: I have reviewed all pertinent imaging results/findings within the past 24 hours.    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  Patient had BLE weakness for 3 days with associated constipation and loss of sensation in the LE but had resolved by the time he went to the ED.  At this  time his strength and sensation has returned to full function.    63M PMH HTN, CAD, ETOH abuse and prior CVA presenting with 1 week of BLE weakness and numbness that has resolved. MRI reveals L4 mass and osseous lesions concerning for metastatic disease. Neurointact on my exam.     -- decadron 4mg q 6hr  -- he will require mass biopsy  -- Obtaining CT chest/abdomen/pelvis metastatic workup.  -- Obtain CT lumbar spine without contrast.  -- Repeating MRI lumbar   -- Neurochecks q4h. Call NSGY with decline in exam.      Hyponatremia  - Will fluid restrict, recheck sodium for improvement      Acute blood loss anemia  No obvious signs of bleeding.  Was intiailly 7.1 on presentation, received IVF and subsequent Hgb was 6.5.  Received 1u pRBC before transfer.  Monitoring CBCs and no signs of bleeding at the moment      Metastatic cancer to bone  63M PMH HTN, CAD, ETOH abuse and prior CVA presenting with 1 week of BLE weakness and numbness that has resolved. MRI reveals L4 mass and osseous lesions concerning for metastatic disease.      -- Obtain CT chest/abdomen/pelvis metastatic workup  -- PSA elevated 500s        Alcohol abuse        Coronary artery disease involving native coronary artery of native heart  Holding ASA and Plavix in case of procedure      Essential hypertension  Is on coreg 25 BID and norvasc 10 at home but reports only taking it when he has a headache. Held earlier for hypotension. Added back coreg      Hyperlipidemia          VTE Risk Mitigation (From admission, onward)        Ordered     enoxaparin injection 40 mg  Daily      04/06/19 0535     IP VTE HIGH RISK PATIENT  Once      04/06/19 0535              Paras Izaguirre MD  Department of Hospital Medicine   [facility] [address]

## 2019-04-08 NOTE — PLAN OF CARE
Problem: Adult Inpatient Plan of Care  Goal: Plan of Care Review  Outcome: Ongoing (interventions implemented as appropriate)  Pt remains free of falls and injury. Pt makes statement of no pain. Pt went to CT and MRI this shift. Pt requested to sleep, MN vitals not done at this request. 24 hour urine continued. Bed low and locked, Call light within reach.

## 2019-04-08 NOTE — PLAN OF CARE
Problem: Adult Inpatient Plan of Care  Goal: Plan of Care Review  Outcome: Ongoing (interventions implemented as appropriate)     04/08/19 1819   Plan of Care Review   Plan of Care Reviewed With patient   Progress improving       Problem: Fall Injury Risk  Goal: Absence of Fall and Fall-Related Injury    Intervention: Identify and Manage Contributors to Fall Injury Risk     04/08/19 0921   Identify and Manage Contributors to Fall Injury Risk   Self-Care Promotion independence encouraged;BADL personal objects within reach;BADL personal routines maintained;meal setup provided;safe use of adaptive equipment encouraged   Manage Acute Allergic Reaction   Medication Review/Management medications reviewed

## 2019-04-08 NOTE — PLAN OF CARE
Problem: Occupational Therapy Goal  Goal: Occupational Therapy Goal  Outcome: Outcome(s) achieved Date Met: 04/08/19  Evaluation complete.  Pt able to perform functional tasks at baseline.  DC lyn OT  Alba Yoo OT  4/8/2019

## 2019-04-08 NOTE — PT/OT/SLP EVAL
Occupational Therapy   Evaluation and Discharge Note    Name: Evaristo Griffith  MRN: [id number]  Admitting Diagnosis:  Lumbar spine tumor      Recommendations:     Discharge Recommendations: home(Simultaneous filing. User may not have seen previous data.)  Discharge Equipment Recommendations:  none(Simultaneous filing. User may not have seen previous data.)  Barriers to discharge:  None    Assessment:     Evaristo Griffith is a 63 y.o. male with a medical diagnosis of Lumbar spine tumor. At this time, patient is functioning at their prior level of function and does not require further acute OT services.     Plan:     During this hospitalization, patient does not require further acute OT services.  Please re-consult if situation changes.    · Plan of Care Reviewed with: patient(Simultaneous filing. User may not have seen previous data.)    Subjective     Chief Complaint: fatigue, difficulty urinating   Patient/Family Comments/goals: return to home and work     Occupational Profile:  Living Environment: Pt lives with his uncle in [address]   Previous level of function: Pt was independent with self care and community performance   Equipment Used at home:  none(Simultaneous filing. User may not have seen previous data.)  Assistance upon Discharge: limited assist after DC     Pain/Comfort:  · Pain Rating 1: 0/10    Patients cultural, spiritual, [employer]aritan conflicts given the current situation: no(Simultaneous filing. User may not have seen previous data.)    Objective:     Communicated with: RN prior to session.  Patient found supine with (lines intact ) upon OT entry to room.    General Precautions: Standard, fall(Simultaneous filing. User may not have seen previous data.)   Orthopedic Precautions:N/A(Simultaneous filing. User may not have seen previous data.)   Braces: N/A(Simultaneous filing. User may not have seen previous data.)     Occupational Performance:    Bed Mobility:    · Pt abl eot demonstrate safe and  independent bed mobility     Functional Mobility/Transfers:  · Functional Mobility: Pt with independent in room and hallway mobility     Activities of Daily Living:  · Pt able to complete self care performance     Cognitive/Visual Perceptual:  Cognitive/Psychosocial Skills:     -       Oriented to: Person, Place, Time and Situation   -       Follows Commands/attention:Follows two-step commands  -       Communication: clear/fluent  -       Memory: No Deficits noted  -       Safety awareness/insight to disability: intact   -       Mood/Affect/Coping skills/emotional control: Appropriate to situation    Physical Exam:  Upper Extremity Range of Motion:     -       Right Upper Extremity: WFL  -       Left Upper Extremity: WFL  Upper Extremity Strength:    -       Right Upper Extremity: WFL  -       Left Upper Extremity: WFL    AMPAC 6 Click ADL:  AMPAC Total Score: 24    Treatment & Education:  Evaluation complete.  Pt educated on safety, role of OT, importance of increased participation in self care for gains , expectations for participation, expectations for gains, POC, energy conservation, caregiver strain. White board updated.     Education:    Patient left up in bed  with all lines intact    GOALS:   Multidisciplinary Problems     Occupational Therapy Goals     Not on file          Multidisciplinary Problems (Resolved)        Problem: Occupational Therapy Goal    Goal Priority Disciplines Outcome Interventions   Occupational Therapy Goal   (Resolved)     OT, PT/OT Outcome(s) achieved                    History:     Past Medical History:   Diagnosis Date    Alcohol abuse     Coronary artery disease involving native coronary artery of native heart     Essential hypertension     Hyperlipidemia     Stroke        History reviewed. No pertinent surgical history.    Time Tracking:     OT Date of Treatment: 04/08/19  OT Start Time: 0825  OT Stop Time: 0835  OT Total Time (min): 10 min    Billable Minutes:Evaluation  10    Alba Yoo, OT  4/8/2019

## 2019-04-08 NOTE — ASSESSMENT & PLAN NOTE
63M PMH HTN, CAD, ETOH abuse and prior CVA presenting with 1 week of BLE weakness and numbness that has resolved. MRI reveals L4 mass and osseous lesions concerning for metastatic disease. Neurointact on my exam.    -- No acute neurosurgical intervention necessary.   -- Recommend IR bone bx for diagnosis. Pathology will determine any need for surgical intervention prior to radtx.  -- CT chest/abdomen/pelvis with concern for prostate primary lesion.  -- Obtain standing xray lumbar spine.  -- Dex 4q6  -- Neurochecks q4h. Call NSGY with decline in exam.  -- Please measure post void residuals.  -- Further care per primary team.

## 2019-04-09 ENCOUNTER — CLINICAL SUPPORT (OUTPATIENT)
Dept: SMOKING CESSATION | Facility: CLINIC | Age: 64
DRG: 478 | End: 2019-04-09
Attending: INTERNAL MEDICINE

## 2019-04-09 DIAGNOSIS — F17.200 NICOTINE DEPENDENCE: Primary | ICD-10-CM

## 2019-04-09 LAB
ALBUMIN SERPL BCP-MCNC: 2.3 G/DL (ref 3.5–5.2)
ALP SERPL-CCNC: 505 U/L (ref 55–135)
ALT SERPL W/O P-5'-P-CCNC: 31 U/L (ref 10–44)
ANION GAP SERPL CALC-SCNC: 6 MMOL/L (ref 8–16)
AST SERPL-CCNC: 35 U/L (ref 10–40)
BASOPHILS # BLD AUTO: 0 K/UL (ref 0–0.2)
BASOPHILS NFR BLD: 0 % (ref 0–1.9)
BILIRUB SERPL-MCNC: 0.3 MG/DL (ref 0.1–1)
BUN SERPL-MCNC: 16 MG/DL (ref 8–23)
CALCIUM SERPL-MCNC: 8.1 MG/DL (ref 8.7–10.5)
CHLORIDE SERPL-SCNC: 103 MMOL/L (ref 95–110)
CO2 SERPL-SCNC: 21 MMOL/L (ref 23–29)
CREAT SERPL-MCNC: 0.7 MG/DL (ref 0.5–1.4)
DIFFERENTIAL METHOD: ABNORMAL
EOSINOPHIL # BLD AUTO: 0 K/UL (ref 0–0.5)
EOSINOPHIL NFR BLD: 0 % (ref 0–8)
ERYTHROCYTE [DISTWIDTH] IN BLOOD BY AUTOMATED COUNT: 25.2 % (ref 11.5–14.5)
EST. GFR  (AFRICAN AMERICAN): >60 ML/MIN/1.73 M^2
EST. GFR  (NON AFRICAN AMERICAN): >60 ML/MIN/1.73 M^2
GLUCOSE SERPL-MCNC: 138 MG/DL (ref 70–110)
HCT VFR BLD AUTO: 25.7 % (ref 40–54)
HGB BLD-MCNC: 7.9 G/DL (ref 14–18)
IMM GRANULOCYTES # BLD AUTO: 0.19 K/UL (ref 0–0.04)
IMM GRANULOCYTES NFR BLD AUTO: 2.6 % (ref 0–0.5)
INTERPRETATION SERPL IFE-IMP: NORMAL
LYMPHOCYTES # BLD AUTO: 0.6 K/UL (ref 1–4.8)
LYMPHOCYTES NFR BLD: 8 % (ref 18–48)
MAGNESIUM SERPL-MCNC: 1.6 MG/DL (ref 1.6–2.6)
MCH RBC QN AUTO: 24.4 PG (ref 27–31)
MCHC RBC AUTO-ENTMCNC: 30.7 G/DL (ref 32–36)
MCV RBC AUTO: 79 FL (ref 82–98)
MONOCYTES # BLD AUTO: 0.9 K/UL (ref 0.3–1)
MONOCYTES NFR BLD: 12.2 % (ref 4–15)
NEUTROPHILS # BLD AUTO: 5.7 K/UL (ref 1.8–7.7)
NEUTROPHILS NFR BLD: 77.2 % (ref 38–73)
NRBC BLD-RTO: 2 /100 WBC
PATHOLOGIST INTERPRETATION IFE: NORMAL
PATHOLOGIST INTERPRETATION SPE: NORMAL
PHOSPHATE SERPL-MCNC: 2.7 MG/DL (ref 2.7–4.5)
PLATELET # BLD AUTO: 327 K/UL (ref 150–350)
PMV BLD AUTO: 10.3 FL (ref 9.2–12.9)
POTASSIUM SERPL-SCNC: 4.4 MMOL/L (ref 3.5–5.1)
PROT SERPL-MCNC: 6.2 G/DL (ref 6–8.4)
RBC # BLD AUTO: 3.24 M/UL (ref 4.6–6.2)
SODIUM SERPL-SCNC: 130 MMOL/L (ref 136–145)
WBC # BLD AUTO: 7.36 K/UL (ref 3.9–12.7)

## 2019-04-09 PROCEDURE — 25000003 PHARM REV CODE 250: Performed by: STUDENT IN AN ORGANIZED HEALTH CARE EDUCATION/TRAINING PROGRAM

## 2019-04-09 PROCEDURE — 36415 COLL VENOUS BLD VENIPUNCTURE: CPT

## 2019-04-09 PROCEDURE — 85025 COMPLETE CBC W/AUTO DIFF WBC: CPT

## 2019-04-09 PROCEDURE — 84100 ASSAY OF PHOSPHORUS: CPT

## 2019-04-09 PROCEDURE — 63600175 PHARM REV CODE 636 W HCPCS: Performed by: STUDENT IN AN ORGANIZED HEALTH CARE EDUCATION/TRAINING PROGRAM

## 2019-04-09 PROCEDURE — 99233 PR SUBSEQUENT HOSPITAL CARE,LEVL III: ICD-10-PCS | Mod: ,,, | Performed by: HOSPITALIST

## 2019-04-09 PROCEDURE — 83735 ASSAY OF MAGNESIUM: CPT

## 2019-04-09 PROCEDURE — 99406 BEHAV CHNG SMOKING 3-10 MIN: CPT | Mod: S$GLB,,,

## 2019-04-09 PROCEDURE — 99222 PR INITIAL HOSPITAL CARE,LEVL II: ICD-10-PCS | Mod: ,,, | Performed by: RADIOLOGY

## 2019-04-09 PROCEDURE — 11000001 HC ACUTE MED/SURG PRIVATE ROOM

## 2019-04-09 PROCEDURE — 80053 COMPREHEN METABOLIC PANEL: CPT

## 2019-04-09 PROCEDURE — 99233 SBSQ HOSP IP/OBS HIGH 50: CPT | Mod: ,,, | Performed by: HOSPITALIST

## 2019-04-09 PROCEDURE — 99406 PT REFUSED TOBACCO CESSATION: ICD-10-PCS | Mod: S$GLB,,,

## 2019-04-09 PROCEDURE — 99222 1ST HOSP IP/OBS MODERATE 55: CPT | Mod: ,,, | Performed by: RADIOLOGY

## 2019-04-09 RX ADMIN — DEXAMETHASONE SODIUM PHOSPHATE 4 MG: 4 INJECTION, SOLUTION INTRA-ARTICULAR; INTRALESIONAL; INTRAMUSCULAR; INTRAVENOUS; SOFT TISSUE at 02:04

## 2019-04-09 RX ADMIN — CARVEDILOL 12.5 MG: 12.5 TABLET, FILM COATED ORAL at 10:04

## 2019-04-09 RX ADMIN — CARVEDILOL 12.5 MG: 12.5 TABLET, FILM COATED ORAL at 09:04

## 2019-04-09 RX ADMIN — PANTOPRAZOLE SODIUM 40 MG: 40 TABLET, DELAYED RELEASE ORAL at 09:04

## 2019-04-09 RX ADMIN — DEXAMETHASONE SODIUM PHOSPHATE 4 MG: 4 INJECTION, SOLUTION INTRA-ARTICULAR; INTRALESIONAL; INTRAMUSCULAR; INTRAVENOUS; SOFT TISSUE at 06:04

## 2019-04-09 RX ADMIN — ENOXAPARIN SODIUM 40 MG: 100 INJECTION SUBCUTANEOUS at 04:04

## 2019-04-09 RX ADMIN — DEXAMETHASONE SODIUM PHOSPHATE 4 MG: 4 INJECTION, SOLUTION INTRA-ARTICULAR; INTRALESIONAL; INTRAMUSCULAR; INTRAVENOUS; SOFT TISSUE at 05:04

## 2019-04-09 NOTE — ASSESSMENT & PLAN NOTE
-- decadron 4mg q 6hr, will taper upon discharge  -- he will require mass biopsy, will be done by IR tomorrow, NPO midnight  -- Call NSGY with decline in exam.

## 2019-04-09 NOTE — PROGRESS NOTES
Spoke with patient today regarding [facility]. Patient states he is interested in the program, but lives in [place]. He is smoking about 1 ppd.  Discussed program and how it works. Discussed the effects of smoking and benefits of quitting with patient, he verbalized understanding. Provided him with smoking cessation contact information for the [facility].

## 2019-04-09 NOTE — PLAN OF CARE
Problem: Fall Injury Risk  Goal: Absence of Fall and Fall-Related Injury  Outcome: Ongoing (interventions implemented as appropriate)  MEds given as ordered tolerated well. No complaints of pain. No signs or symptoms of distress/dsicomfort noted. Will continue to monitor.

## 2019-04-09 NOTE — PLAN OF CARE
Problem: Adult Inpatient Plan of Care  Goal: Plan of Care Review  Outcome: Ongoing (interventions implemented as appropriate)  No acute changes overnight. Pt NPO since midnight for possible biopsy. Pt denies pain. No falls or injury during shift. Call light in reach. TM

## 2019-04-09 NOTE — PLAN OF CARE
CM met with patient to complete discharge assessment and planning. Patient currently lives in one story home with SHIVA Ledesma ([phone]). Patient plans to return home post hospital discharge. Patient reports he does not have transportation home. Patient does not have medical insurance at this time, pt filed  application for [place]  Medicaid. Patient denies having a PCP. Patient receives medical care at [facility], [place], [place]. Patient request CM to schedule follow up appt with [facility] in [place], [place]. CM and SW will continue to follow for any discharge needs.       04/09/19 1889   Discharge Assessment   Assessment Type Discharge Planning Assessment   Confirmed/corrected address and phone number on facesheet? Yes   Assessment information obtained from? Patient   Communicated expected length of stay with patient/caregiver yes   Prior to hospitilization cognitive status: Alert/Oriented   Prior to hospitalization functional status: Independent   Current cognitive status: Alert/Oriented   Current Functional Status: Independent   Lives With significant other   Able to Return to Prior Arrangements yes   Is patient able to care for self after discharge? Yes   Who are your caregiver(s) and their phone number(s)? Yvonne Solomon [phone]   Patient's perception of discharge disposition home or selfcare   Patient currently being followed by outpatient case management? No   Equipment Currently Used at Home none   Do you have any problems affording any of your prescribed medications? Yes   Is the patient taking medications as prescribed? no   Does the patient have transportation home? No   Dialysis Name and Scheduled days n/a   Does the patient receive services at the [facility]? No   Discharge Plan A Home;Home with family   Discharge Plan B Home;Home with family   DME Needed Upon Discharge  none   Patient/Family in Agreement with Plan yes

## 2019-04-09 NOTE — ASSESSMENT & PLAN NOTE
63M PMH HTN, CAD, ETOH abuse and prior CVA presenting with 1 week of BLE weakness and numbness that has resolved. MRI reveals L4 mass and osseous lesions concerning for metastatic disease.     -- PSA elevated 500s  -- bone biopsy 4/10

## 2019-04-09 NOTE — SUBJECTIVE & OBJECTIVE
Interval History: No acute events overnight.     Review of Systems   Constitutional: Positive for activity change and appetite change. Negative for chills, fatigue and fever.   HENT: Negative for congestion and sinus pain.    Eyes: Negative for visual disturbance.   Respiratory: Negative for cough and shortness of breath.    Cardiovascular: Negative for chest pain and palpitations.   Gastrointestinal: Negative for abdominal distention and abdominal pain.   Genitourinary: Positive for difficulty urinating.   Musculoskeletal: Positive for back pain and gait problem (boht improved). Negative for arthralgias.   Skin: Negative for color change and pallor.   Neurological: Positive for weakness (improved).  Negative for numbness. for dizziness, light-headedness and headaches.    Objective:     Vital Signs (Most Recent):  Temp: 96.5 °F (35.8 °C) (04/09/19 1148)  Pulse: 68 (04/09/19 1148)  Resp: 18 (04/09/19 1148)  BP: (!) 157/77 (04/09/19 1148)  SpO2: (!) 94 % (04/09/19 1148) Vital Signs (24h Range):  Temp:  [96.3 °F (35.7 °C)-98.1 °F (36.7 °C)] 96.5 °F (35.8 °C)  Pulse:  [63-68] 68  Resp:  [18] 18  SpO2:  [92 %-98 %] 94 %  BP: (156-172)/(77-83) 157/77     Weight: 63.6 kg (140 lb 3.4 oz)  Body mass index is 19.02 kg/m².    Intake/Output Summary (Last 24 hours) at 4/9/2019 1350  Last data filed at 4/9/2019 0418  Gross per 24 hour   Intake 530 ml   Output 1675 ml   Net -1145 ml      Physical Exam  Constitutional: He is oriented to person, place, and time. He appears well-developed and well-nourished. No distress.   HENT:   Head: Normocephalic and atraumatic.   Eyes: Conjunctivae and EOM are normal.   Neck: Normal range of motion.   Cardiovascular: Normal rate, regular rhythm and normal heart sounds.   No murmur heard.  Pulmonary/Chest: Effort normal and breath sounds normal. No respiratory distress.   Abdominal: Soft. Bowel sounds are normal. He exhibits no distension. There is no tenderness.   Musculoskeletal: Normal range  of motion. He exhibits no edema or deformity.   Neurological: He is alert and oriented to person, place, and time. No cranial nerve deficit or sensory deficit.   5/5 strength BUE and BLE  5/5 plantar and dorsiflexion   Skin: Skin is warm. Capillary refill takes less than 2 seconds.   Nursing note and vitals reviewed.    Significant Labs: All pertinent labs within the past 24 hours have been reviewed.    Significant Imaging: I have reviewed all pertinent imaging results/findings within the past 24 hours.

## 2019-04-09 NOTE — H&P
Inpatient Radiology Pre-procedure Note    History of Present Illness:  Evaristo Griffith is a 63 y.o. male with bilateral lower extremity weakness and L4 tumor who presents for L4 bone biopsy.  Admission H&P reviewed.  Past Medical History:   Diagnosis Date    Alcohol abuse     Coronary artery disease involving native coronary artery of native heart     Essential hypertension     Hyperlipidemia     Stroke      History reviewed. No pertinent surgical history.    Review of Systems:   As documented in primary team H&P    Home Meds:   Prior to Admission medications    Medication Sig Start Date End Date Taking? Authorizing Provider   acetaminophen (TYLENOL) 500 MG tablet Take 500-1,000 mg by mouth daily as needed (back pain).   Yes Historical Provider, MD   ASA/acetaminophen/caffeine/pot (GOODY'S HEADACHE POWDER ORAL) Take 2 Packages by mouth daily as needed (back pain).   Yes Historical Provider, MD     Scheduled Meds:    carvedilol  12.5 mg Oral BID    dexamethasone (DECADRON) IVPB  4 mg Intravenous Q6H    enoxparin  40 mg Subcutaneous Daily    pantoprazole  40 mg Oral Daily     Continuous Infusions:   PRN Meds:acetaminophen, dextrose 50%, dextrose 50%, glucagon (human recombinant), glucose, glucose, omnipaque, sodium chloride 0.9%  Anticoagulants/Antiplatelets: aspirin. Last dose 4/7/19. Lovenox.     Allergies: Review of patient's allergies indicates:  No Known Allergies  Sedation Hx: have not been any systemic reactions    Labs:  Recent Labs   Lab 04/07/19  0521   INR 1.1       Recent Labs   Lab 04/09/19  0531   WBC 7.36   HGB 7.9*   HCT 25.7*   MCV 79*         Recent Labs   Lab 04/09/19  0531   *   *   K 4.4      CO2 21*   BUN 16   CREATININE 0.7   CALCIUM 8.1*   MG 1.6   ALT 31   AST 35   ALBUMIN 2.3*   BILITOT 0.3         Vitals:  Temp: 96.5 °F (35.8 °C) (04/09/19 1148)  Pulse: 68 (04/09/19 1148)  Resp: 18 (04/09/19 1148)  BP: (!) 157/77 (04/09/19 1148)  SpO2: (!) 94 % (04/09/19  2949)     Physical Exam:  ASA: 3  Mallampati: 2    General: no acute distress  Mental Status: alert and oriented to person, place and time  HEENT: normocephalic, atraumatic  Chest: unlabored breathing  Heart: regular heart rate  Abdomen: nondistended  Extremity: moves all extremities    Plan: L4 bone biopsy  Sedation Plan: moderate    Cheryl Zepeda MD  PGY-5  Department of Radiology  Pager: [phone]

## 2019-04-09 NOTE — CONSULTS
[facility]  Radiation Oncology  Consult Note    Patient Name: Evaristo Griffith  MRN: [id number]  Admission Date: 4/6/2019  Hospital Length of Stay: 3 days  Code Status: Full Code   Attending Provider: Nicole Pardo MD  Consulting Provider: Leonard Yin MD  Primary Care Physician: Primary Doctor No  Principal Problem:Lumbar spine tumor    Inpatient consult to Radiation Oncology  Consult performed by: Leonard Yin MD  Consult ordered by: Paras Izaguirre MD        Subjective:     HPI:  Mr. Griffith is a 64 y/o male without oncologic history who was transferred to [facility] 4/6/19 for surgical evaluation. Approximately 2 weeks ago he reports that his legs suddenly gave out on him, with associated numbness in the legs and constipation. Outside imaging reportedly demonstrated bone lesions so he was transferred for evaluation of cord compression. On 4/6/19 PSA was elevated at 589. MRI Total Spine demonstrated multilevel metastatic disease with posterior extension of L4 lesion resulting in compression of the thecal sac. His symptoms of numbness and weakness resolved with steroids. CT C/A/P 4/7/19 demonstrated a 6.3 x 5.1 cm soft tissue lesion involving the prostate, bladder, and Right SV. Biopsy of a bone lesion is pending to establish pathologic diagnosis of cancer. Radiation Oncology was consulted for consideration of treatment options.     I met with the patient at bedside. He reports his neurologic symptoms have resolved, though he continues to have to strain with urination. This symptom has been present for >6 months.     Oncology Treatment Plan:   [No treatment plan]    Current Facility-Administered Medications   Medication    acetaminophen tablet 650 mg    carvedilol tablet 12.5 mg    dexamethasone (DECADRON) 4 mg in sodium chloride 0.9% 50 mL IVPB    dextrose 50% injection 12.5 g    dextrose 50% injection 25 g    enoxaparin injection 40 mg    glucagon (human recombinant) injection 1 mg     glucose chewable tablet 16 g    glucose chewable tablet 24 g    omnipaque oral solution 15 mL    pantoprazole EC tablet 40 mg    sodium chloride 0.9% flush 10 mL       Review of patient's allergies indicates:  No Known Allergies     Past Medical History:   Diagnosis Date    Alcohol abuse     Coronary artery disease involving native coronary artery of native heart     Essential hypertension     Hyperlipidemia     Stroke      History reviewed. No pertinent surgical history.  Family History     None        Tobacco Use    Smoking status: Current Every Day Smoker     Packs/day: 1.00     Years: 55.00     Pack years: 55.00     Types: Cigarettes    Smokeless tobacco: Never Used   Substance and Sexual Activity    Alcohol use: Not on file    Drug use: Not on file    Sexual activity: Not on file       Review of Systems   All other systems reviewed and are negative.    Objective:     Vital Signs (Most Recent):  Temp: 98.1 °F (36.7 °C) (04/09/19 0510)  Pulse: 63 (04/09/19 0510)  Resp: 18 (04/09/19 0510)  BP: (!) 160/83 (04/09/19 0510)  SpO2: (!) 92 % (04/09/19 0510) Vital Signs (24h Range):  Temp:  [97.8 °F (36.6 °C)-98.2 °F (36.8 °C)] 98.1 °F (36.7 °C)  Pulse:  [63-74] 63  Resp:  [18-20] 18  SpO2:  [92 %-99 %] 92 %  BP: (149-160)/(77-83) 160/83     Weight: 63.6 kg (140 lb 3.4 oz)  Body mass index is 19.02 kg/m².  Body surface area is 1.8 meters squared.    Physical Exam   Constitutional: He is oriented to person, place, and time. He appears well-developed and well-nourished.   HENT:   Head: Normocephalic and atraumatic.   Mouth/Throat: Mucous membranes are normal.   Eyes: EOM are normal. No scleral icterus.   Neck: Normal range of motion. Neck supple.   Pulmonary/Chest: Effort normal. No accessory muscle usage. No respiratory distress.   Abdominal: Soft. Normal appearance.   Musculoskeletal: Normal range of motion. He exhibits no edema.   Lymphadenopathy:     He has no cervical adenopathy.        Right: No  supraclavicular adenopathy present.        Left: No supraclavicular adenopathy present.   Neurological: He is alert and oriented to person, place, and time. He has normal strength. No cranial nerve deficit.   Skin: Skin is warm. No rash noted. No cyanosis.   Psychiatric: He has a normal mood and affect. His speech is normal. Judgment normal.   Vitals reviewed.      Significant Labs:   4/6/19: , elevated    Diagnostic Results:  I have personally reviewed the patient's available images and reports and summarized pertinent findings above in HPI.     Assessment/Plan:     Metastatic cancer to bone  This is a 62 y/o male admitted to [facility] 4/6/19 with bilateral lower extremity weakness/loss of sensation, which resolved with initiation of decadron. MRI Total Spine demonstrates osseous lesions c/w metastases as well as compression of thecal sac from disease at L4. CT C/A/P demonstrates disease involving the prostate and bladder c/w local extension of primary prostate cancer; PSA is elevated at 589. Pathologic diagnosis is pending. Radiation Oncology is consulted for consideration of treatment options.     1) I discussed the role of radiotherapy in palliation of tumors that compress the spinal cord or nerves when surgical decompression is not recommended. I recommend treating the involved spine to 30 Gy in 10 fractions if neurosurgery elects not to operate. At this time there is no urgent indication for radiation since his symptoms have resolved with decadron. If he is not going to undergo surgical decompression and is anticipated to be discharged by later this week, then I would advise referral to a radiation oncologist locally in [place], [place], since patient states he does not desire to drive back and forth for treatment at [facility].     2) Regarding his obstructive urinary symptoms, this may be more related to mass effect from likely prostate cancer rather than neurogenic bladder. Please consider starting him on flomax 0.4 mg  daily.            Thank you for your consult. I will sign off. Please contact us if you have any additional questions.    Leonard Yin MD  Radiation Oncology  [facility]

## 2019-04-09 NOTE — HPI
Mr. Griffith is a 62 y/o male without oncologic history who was transferred to [facility] 4/6/19 for surgical evaluation. Approximately 2 weeks ago he reports that his legs suddenly gave out on him, with associated numbness in the legs and constipation. Outside imaging reportedly demonstrated bone lesions so he was transferred for evaluation of cord compression. On 4/6/19 PSA was elevated at 589. MRI Total Spine demonstrated multilevel metastatic disease with posterior extension of L4 lesion resulting in compression of the thecal sac. His symptoms of numbness and weakness resolved with steroids. CT C/A/P 4/7/19 demonstrated a 6.3 x 5.1 cm soft tissue lesion involving the prostate, bladder, and Right SV. Biopsy of a bone lesion is pending to establish pathologic diagnosis of cancer. Radiation Oncology was consulted for consideration of treatment options.     I met with the patient at bedside. He reports his neurologic symptoms have resolved, though he continues to have to strain with urination. This symptom has been present for >6 months.

## 2019-04-09 NOTE — ASSESSMENT & PLAN NOTE
This is a 62 y/o male admitted to [facility] 4/6/19 with bilateral lower extremity weakness/loss of sensation, which resolved with initiation of decadron. MRI Total Spine demonstrates osseous lesions c/w metastases as well as compression of thecal sac from disease at L4. CT C/A/P demonstrates disease involving the prostate and bladder c/w local extension of primary prostate cancer; PSA is elevated at 589. Pathologic diagnosis is pending. Radiation Oncology is consulted for consideration of treatment options.     1) I discussed the role of radiotherapy in palliation of tumors that compress the spinal cord or nerves when surgical decompression is not recommended. I recommend treating the involved spine to 30 Gy in 10 fractions if neurosurgery elects not to operate. At this time there is no urgent indication for radiation since his symptoms have resolved with decadron. If he is not going to undergo surgical decompression and is anticipated to be discharged by later this week, then I would advise referral to a radiation oncologist locally in [place], [place], since patient states he does not desire to drive back and forth for treatment at [facility].     2) Regarding his obstructive urinary symptoms, this may be more related to mass effect from likely prostate cancer rather than neurogenic bladder. Please consider starting him on flomax 0.4 mg daily.

## 2019-04-09 NOTE — PLAN OF CARE
[facility]  Neurosurgery  Progress Note        IR planning for biopsy tomorrow with Dr. Carrasco for diagnosis. Rad onc consulted and following. No recommendation for neurosurgical intervention at this time given stable neuro exam. Will schedule outpatient follow up to monitor lumbar lesion. Please contact with any questions or concerns.    Recommendations for dexamethasone taper:    Take 4mg q 6h for 3 days, then 4mg q 8h for 3 days, then 4mg q 12h for 3 days, then 2mg q 8h for 3 days, then 2mg q 12h until follow up with rad onc.    Discussed with Dr. Briscoe.      Mary Soni PA-C  Neurosurgery  [id number]

## 2019-04-09 NOTE — PROGRESS NOTES
[facility] Hospital Medicine  Progress Note    Patient Name: Evaristo Grififth  MRN: [id number]  Patient Class: IP- Inpatient   Admission Date: 4/6/2019  Length of Stay: 3 days  Attending Physician: Nicole Pardo MD  Primary Care Provider: Primary Doctor [facility] Medicine Team: [facility] 5 Paras Izaguirre MD    Subjective:     Principal Problem:Lumbar spine tumor    HPI:  62 y/o male with essential HTN, CAD, ETOH abuse and prior CVA was admitted to [facility] early this am on 4/5 with bilateral lower extremity weakness for about 1 week. He also complained of loss of sensation in bilateral legs, and issues with constipation.  By the time he had arrived at the ED in [place] he states that most of his symptoms had began to resolve.  Patient noted on admit to have mild OMARI of Cr 1.4 with mild hyponatremia with sodium of 126. Patient given IVFs and a Banana bag and last Cr down to 1.0 and sodium improved to 132. Patient on admit had Hgb 7.1 but after IVFs has now dropped to 6.5. Patient in process of being transfused 1 unit of PBCs. Patient with no obvious active bleeding but was started on IV Protonix at 40 mg BID. Patient denies any melena or BRBPR, epistaxis or hematuria or hemoptysis. Patient also on labs noted to have normal AST, ALT, T. pete but elevated . Patient noted to have normal TSH, Vitamin B12 and folate levels. Patient noted on exam to have decreased strength in lower extremities 1/5 and decreased sensation but reflexes intact with some muscle wasting to lower extremities. Patient with no bladder or bowel incontinence. Rectal tone not checked. Due to leg weakness patient underwent MRI of brain that was unremarkable except for remote infarct but MRI of lumbar spine revealed spinal tumor at L4 with extraosseous extension causing compression to thecal sac at that level as well as widespread other osseous lesions of spine highly concerning  for metastatic cancer. Patient with no history of cancer. Dr. Briscoe from Neurosurgery was conferenced into call and he stated patient likely would need spinal decompression but asked for hospital medicine admit due to other medical needs such as anemia evaluation and help with coordination of care as patient has suspected metastatic cancer of unknown primary. Dr. Briscoe asked for Neurosurgery to be consulted. Patient was told results of MRI by transferring physician.         Hospital Course:  Patient denies any symptoms of weakness at this time. Denies loss of sensation. He is able to walk and control urination & bowel movements. 4/8, patient reports feeling well over all although he is still straining occasionally to urinate. Neurosurgery has evaluated him, no acute intervention at this time, and they have recommended consult to radiation oncology. Interventional radiology has recommended vertebral bone biopsy as an outpatient. Will consult radiation oncology.  4/9: Patient feels well overall other than straining to urinate. Informed by neurosurgery that he will be getting IR biopsy tomorrow. Following this, he will need to follow up with neurosurgery and radiation oncology outpatient.  aware, will seek resources in [place] as he does not have insurance.     Interval History: No acute events overnight.     Review of Systems   Constitutional: Positive for activity change and appetite change. Negative for chills, fatigue and fever.   HENT: Negative for congestion and sinus pain.    Eyes: Negative for visual disturbance.   Respiratory: Negative for cough and shortness of breath.    Cardiovascular: Negative for chest pain and palpitations.   Gastrointestinal: Negative for abdominal distention and abdominal pain.   Genitourinary: Positive for difficulty urinating.   Musculoskeletal: Positive for back pain and gait problem (boht improved). Negative for arthralgias.   Skin: Negative for color change and  pallor.   Neurological: Positive for weakness (improved).  Negative for numbness. for dizziness, light-headedness and headaches.    Objective:     Vital Signs (Most Recent):  Temp: 96.5 °F (35.8 °C) (04/09/19 1148)  Pulse: 68 (04/09/19 1148)  Resp: 18 (04/09/19 1148)  BP: (!) 157/77 (04/09/19 1148)  SpO2: (!) 94 % (04/09/19 1148) Vital Signs (24h Range):  Temp:  [96.3 °F (35.7 °C)-98.1 °F (36.7 °C)] 96.5 °F (35.8 °C)  Pulse:  [63-68] 68  Resp:  [18] 18  SpO2:  [92 %-98 %] 94 %  BP: (156-172)/(77-83) 157/77     Weight: 63.6 kg (140 lb 3.4 oz)  Body mass index is 19.02 kg/m².    Intake/Output Summary (Last 24 hours) at 4/9/2019 1350  Last data filed at 4/9/2019 0418  Gross per 24 hour   Intake 530 ml   Output 1675 ml   Net -1145 ml      Physical Exam  Constitutional: He is oriented to person, place, and time. He appears well-developed and well-nourished. No distress.   HENT:   Head: Normocephalic and atraumatic.   Eyes: Conjunctivae and EOM are normal.   Neck: Normal range of motion.   Cardiovascular: Normal rate, regular rhythm and normal heart sounds.   No murmur heard.  Pulmonary/Chest: Effort normal and breath sounds normal. No respiratory distress.   Abdominal: Soft. Bowel sounds are normal. He exhibits no distension. There is no tenderness.   Musculoskeletal: Normal range of motion. He exhibits no edema or deformity.   Neurological: He is alert and oriented to person, place, and time. No cranial nerve deficit or sensory deficit.   5/5 strength BUE and BLE  5/5 plantar and dorsiflexion   Skin: Skin is warm. Capillary refill takes less than 2 seconds.   Nursing note and vitals reviewed.    Significant Labs: All pertinent labs within the past 24 hours have been reviewed.    Significant Imaging: I have reviewed all pertinent imaging results/findings within the past 24 hours.    Assessment/Plan:      * Lumbar spine tumor at L4  -- decadron 4mg q 6hr, will taper upon discharge  -- he will require mass biopsy, will be  done by IR tomorrow, NPO midnight  -- Call NSGY with decline in exam.      Hyponatremia  - Will fluid restrict, recheck sodium for improvement      Acute blood loss anemia  No obvious signs of bleeding.  Was intiailly 7.1 on presentation, received IVF and subsequent Hgb was 6.5.  Received 1u pRBC before transfer.  Monitoring CBCs and no signs of bleeding at the moment      Metastatic cancer to bone  63M PMH HTN, CAD, ETOH abuse and prior CVA presenting with 1 week of BLE weakness and numbness that has resolved. MRI reveals L4 mass and osseous lesions concerning for metastatic disease.     -- PSA elevated 500s  -- bone biopsy 4/10        Alcohol abuse        Coronary artery disease involving native coronary artery of native heart  Holding ASA and Plavix for procedure      Essential hypertension  Is on coreg 25 BID and norvasc 10 at home but reports only taking it when he has a headache. Held earlier for hypotension. Added back coreg      Hyperlipidemia          VTE Risk Mitigation (From admission, onward)        Ordered     enoxaparin injection 40 mg  Daily      04/06/19 0535     IP VTE HIGH RISK PATIENT  Once      04/06/19 0535              Paras Izaguirre MD  Department of Hospital Medicine   [facility]

## 2019-04-09 NOTE — SUBJECTIVE & OBJECTIVE
Oncology Treatment Plan:   [No treatment plan]    Current Facility-Administered Medications   Medication    acetaminophen tablet 650 mg    carvedilol tablet 12.5 mg    dexamethasone (DECADRON) 4 mg in sodium chloride 0.9% 50 mL IVPB    dextrose 50% injection 12.5 g    dextrose 50% injection 25 g    enoxaparin injection 40 mg    glucagon (human recombinant) injection 1 mg    glucose chewable tablet 16 g    glucose chewable tablet 24 g    omnipaque oral solution 15 mL    pantoprazole EC tablet 40 mg    sodium chloride 0.9% flush 10 mL       Review of patient's allergies indicates:  No Known Allergies     Past Medical History:   Diagnosis Date    Alcohol abuse     Coronary artery disease involving native coronary artery of native heart     Essential hypertension     Hyperlipidemia     Stroke      History reviewed. No pertinent surgical history.  Family History     None        Tobacco Use    Smoking status: Current Every Day Smoker     Packs/day: 1.00     Years: 55.00     Pack years: 55.00     Types: Cigarettes    Smokeless tobacco: Never Used   Substance and Sexual Activity    Alcohol use: Not on file    Drug use: Not on file    Sexual activity: Not on file       Review of Systems   All other systems reviewed and are negative.    Objective:     Vital Signs (Most Recent):  Temp: 98.1 °F (36.7 °C) (04/09/19 0510)  Pulse: 63 (04/09/19 0510)  Resp: 18 (04/09/19 0510)  BP: (!) 160/83 (04/09/19 0510)  SpO2: (!) 92 % (04/09/19 0510) Vital Signs (24h Range):  Temp:  [97.8 °F (36.6 °C)-98.2 °F (36.8 °C)] 98.1 °F (36.7 °C)  Pulse:  [63-74] 63  Resp:  [18-20] 18  SpO2:  [92 %-99 %] 92 %  BP: (149-160)/(77-83) 160/83     Weight: 63.6 kg (140 lb 3.4 oz)  Body mass index is 19.02 kg/m².  Body surface area is 1.8 meters squared.    Physical Exam   Constitutional: He is oriented to person, place, and time. He appears well-developed and well-nourished.   HENT:   Head: Normocephalic and atraumatic.   Mouth/Throat:  Mucous membranes are normal.   Eyes: EOM are normal. No scleral icterus.   Neck: Normal range of motion. Neck supple.   Pulmonary/Chest: Effort normal. No accessory muscle usage. No respiratory distress.   Abdominal: Soft. Normal appearance.   Musculoskeletal: Normal range of motion. He exhibits no edema.   Lymphadenopathy:     He has no cervical adenopathy.        Right: No supraclavicular adenopathy present.        Left: No supraclavicular adenopathy present.   Neurological: He is alert and oriented to person, place, and time. He has normal strength. No cranial nerve deficit.   Skin: Skin is warm. No rash noted. No cyanosis.   Psychiatric: He has a normal mood and affect. His speech is normal. Judgment normal.   Vitals reviewed.      Significant Labs:   4/6/19: , elevated    Diagnostic Results:  I have personally reviewed the patient's available images and reports and summarized pertinent findings above in HPI.

## 2019-04-10 PROBLEM — Z75.8 DISCHARGE PLANNING ISSUES: Status: ACTIVE | Noted: 2019-04-10

## 2019-04-10 LAB
ALBUMIN SERPL BCP-MCNC: 2.4 G/DL (ref 3.5–5.2)
ALP SERPL-CCNC: 550 U/L (ref 55–135)
ALT SERPL W/O P-5'-P-CCNC: 30 U/L (ref 10–44)
ANION GAP SERPL CALC-SCNC: 5 MMOL/L (ref 8–16)
AST SERPL-CCNC: 24 U/L (ref 10–40)
BASOPHILS # BLD AUTO: 0.01 K/UL (ref 0–0.2)
BASOPHILS NFR BLD: 0.1 % (ref 0–1.9)
BILIRUB SERPL-MCNC: 0.3 MG/DL (ref 0.1–1)
BUN SERPL-MCNC: 20 MG/DL (ref 8–23)
CALCIUM SERPL-MCNC: 8 MG/DL (ref 8.7–10.5)
CHLORIDE SERPL-SCNC: 103 MMOL/L (ref 95–110)
CO2 SERPL-SCNC: 23 MMOL/L (ref 23–29)
CREAT SERPL-MCNC: 0.8 MG/DL (ref 0.5–1.4)
DIFFERENTIAL METHOD: ABNORMAL
EOSINOPHIL # BLD AUTO: 0 K/UL (ref 0–0.5)
EOSINOPHIL NFR BLD: 0 % (ref 0–8)
ERYTHROCYTE [DISTWIDTH] IN BLOOD BY AUTOMATED COUNT: 25.2 % (ref 11.5–14.5)
EST. GFR  (AFRICAN AMERICAN): >60 ML/MIN/1.73 M^2
EST. GFR  (NON AFRICAN AMERICAN): >60 ML/MIN/1.73 M^2
GLUCOSE SERPL-MCNC: 127 MG/DL (ref 70–110)
HCT VFR BLD AUTO: 25.9 % (ref 40–54)
HGB BLD-MCNC: 7.8 G/DL (ref 14–18)
IMM GRANULOCYTES # BLD AUTO: 0.21 K/UL (ref 0–0.04)
IMM GRANULOCYTES NFR BLD AUTO: 3 % (ref 0–0.5)
LYMPHOCYTES # BLD AUTO: 0.6 K/UL (ref 1–4.8)
LYMPHOCYTES NFR BLD: 8.2 % (ref 18–48)
MAGNESIUM SERPL-MCNC: 1.8 MG/DL (ref 1.6–2.6)
MCH RBC QN AUTO: 24.5 PG (ref 27–31)
MCHC RBC AUTO-ENTMCNC: 30.1 G/DL (ref 32–36)
MCV RBC AUTO: 81 FL (ref 82–98)
MONOCYTES # BLD AUTO: 1 K/UL (ref 0.3–1)
MONOCYTES NFR BLD: 13.4 % (ref 4–15)
NEUTROPHILS # BLD AUTO: 5.3 K/UL (ref 1.8–7.7)
NEUTROPHILS NFR BLD: 75.3 % (ref 38–73)
NRBC BLD-RTO: 4 /100 WBC
PHOSPHATE SERPL-MCNC: 2.9 MG/DL (ref 2.7–4.5)
PLATELET # BLD AUTO: 333 K/UL (ref 150–350)
PMV BLD AUTO: 9.4 FL (ref 9.2–12.9)
POTASSIUM SERPL-SCNC: 4.3 MMOL/L (ref 3.5–5.1)
PROT SERPL-MCNC: 6.2 G/DL (ref 6–8.4)
RBC # BLD AUTO: 3.19 M/UL (ref 4.6–6.2)
SODIUM SERPL-SCNC: 131 MMOL/L (ref 136–145)
WBC # BLD AUTO: 7.09 K/UL (ref 3.9–12.7)

## 2019-04-10 PROCEDURE — 88311 DECALCIFY TISSUE: CPT | Mod: 26,,, | Performed by: PATHOLOGY

## 2019-04-10 PROCEDURE — 25000003 PHARM REV CODE 250: Performed by: STUDENT IN AN ORGANIZED HEALTH CARE EDUCATION/TRAINING PROGRAM

## 2019-04-10 PROCEDURE — 88341 IMHCHEM/IMCYTCHM EA ADD ANTB: CPT | Mod: 26,,, | Performed by: PATHOLOGY

## 2019-04-10 PROCEDURE — 11000001 HC ACUTE MED/SURG PRIVATE ROOM

## 2019-04-10 PROCEDURE — 88311 TISSUE SPECIMEN TO PATHOLOGY - SURGERY: ICD-10-PCS | Mod: 26,,, | Performed by: PATHOLOGY

## 2019-04-10 PROCEDURE — 88342 IMHCHEM/IMCYTCHM 1ST ANTB: CPT | Performed by: PATHOLOGY

## 2019-04-10 PROCEDURE — 83735 ASSAY OF MAGNESIUM: CPT

## 2019-04-10 PROCEDURE — 36415 COLL VENOUS BLD VENIPUNCTURE: CPT

## 2019-04-10 PROCEDURE — 88307 TISSUE EXAM BY PATHOLOGIST: CPT | Mod: 26,,, | Performed by: PATHOLOGY

## 2019-04-10 PROCEDURE — 88341 PR IHC OR ICC EACH ADD'L SINGLE ANTIBODY  STAINPR: ICD-10-PCS | Mod: 26,,, | Performed by: PATHOLOGY

## 2019-04-10 PROCEDURE — 88342 TISSUE SPECIMEN TO PATHOLOGY - SURGERY: ICD-10-PCS | Mod: 26,,, | Performed by: PATHOLOGY

## 2019-04-10 PROCEDURE — 85025 COMPLETE CBC W/AUTO DIFF WBC: CPT

## 2019-04-10 PROCEDURE — 84100 ASSAY OF PHOSPHORUS: CPT

## 2019-04-10 PROCEDURE — 99232 PR SUBSEQUENT HOSPITAL CARE,LEVL II: ICD-10-PCS | Mod: ,,, | Performed by: HOSPITALIST

## 2019-04-10 PROCEDURE — 80053 COMPREHEN METABOLIC PANEL: CPT

## 2019-04-10 PROCEDURE — 99232 SBSQ HOSP IP/OBS MODERATE 35: CPT | Mod: ,,, | Performed by: HOSPITALIST

## 2019-04-10 PROCEDURE — 63600175 PHARM REV CODE 636 W HCPCS: Performed by: STUDENT IN AN ORGANIZED HEALTH CARE EDUCATION/TRAINING PROGRAM

## 2019-04-10 PROCEDURE — 88307 TISSUE EXAM BY PATHOLOGIST: CPT | Performed by: PATHOLOGY

## 2019-04-10 PROCEDURE — 88342 IMHCHEM/IMCYTCHM 1ST ANTB: CPT | Mod: 26,,, | Performed by: PATHOLOGY

## 2019-04-10 PROCEDURE — 88307 TISSUE SPECIMEN TO PATHOLOGY - SURGERY: ICD-10-PCS | Mod: 26,,, | Performed by: PATHOLOGY

## 2019-04-10 PROCEDURE — 63600175 PHARM REV CODE 636 W HCPCS: Performed by: RADIOLOGY

## 2019-04-10 RX ORDER — TAMSULOSIN HYDROCHLORIDE 0.4 MG/1
0.4 CAPSULE ORAL DAILY
Status: DISCONTINUED | OUTPATIENT
Start: 2019-04-10 | End: 2019-04-11 | Stop reason: HOSPADM

## 2019-04-10 RX ORDER — FENTANYL CITRATE 50 UG/ML
INJECTION, SOLUTION INTRAMUSCULAR; INTRAVENOUS CODE/TRAUMA/SEDATION MEDICATION
Status: COMPLETED | OUTPATIENT
Start: 2019-04-10 | End: 2019-04-10

## 2019-04-10 RX ORDER — MIDAZOLAM HYDROCHLORIDE 1 MG/ML
INJECTION INTRAMUSCULAR; INTRAVENOUS CODE/TRAUMA/SEDATION MEDICATION
Status: COMPLETED | OUTPATIENT
Start: 2019-04-10 | End: 2019-04-10

## 2019-04-10 RX ADMIN — PANTOPRAZOLE SODIUM 40 MG: 40 TABLET, DELAYED RELEASE ORAL at 08:04

## 2019-04-10 RX ADMIN — DEXAMETHASONE SODIUM PHOSPHATE 4 MG: 4 INJECTION, SOLUTION INTRA-ARTICULAR; INTRALESIONAL; INTRAMUSCULAR; INTRAVENOUS; SOFT TISSUE at 01:04

## 2019-04-10 RX ADMIN — MIDAZOLAM HYDROCHLORIDE 1 MG: 1 INJECTION, SOLUTION INTRAMUSCULAR; INTRAVENOUS at 05:04

## 2019-04-10 RX ADMIN — FENTANYL CITRATE 50 MCG: 50 INJECTION, SOLUTION INTRAMUSCULAR; INTRAVENOUS at 05:04

## 2019-04-10 RX ADMIN — DEXAMETHASONE SODIUM PHOSPHATE 4 MG: 4 INJECTION, SOLUTION INTRA-ARTICULAR; INTRALESIONAL; INTRAMUSCULAR; INTRAVENOUS; SOFT TISSUE at 08:04

## 2019-04-10 RX ADMIN — CARVEDILOL 12.5 MG: 12.5 TABLET, FILM COATED ORAL at 08:04

## 2019-04-10 RX ADMIN — TAMSULOSIN HYDROCHLORIDE 0.4 MG: 0.4 CAPSULE ORAL at 08:04

## 2019-04-10 RX ADMIN — DEXAMETHASONE SODIUM PHOSPHATE 4 MG: 4 INJECTION, SOLUTION INTRA-ARTICULAR; INTRALESIONAL; INTRAMUSCULAR; INTRAVENOUS; SOFT TISSUE at 12:04

## 2019-04-10 RX ADMIN — CARVEDILOL 12.5 MG: 12.5 TABLET, FILM COATED ORAL at 09:04

## 2019-04-10 NOTE — PROGRESS NOTES
Procedure complete. Pt tolerated well. Site clean, dry, intact, no bleeding, no hematoma. Pt to recover in ROCU before returning to room. Report called to RN on floor. Report will be given to RN in ROCU at bedside.

## 2019-04-10 NOTE — NURSING
Patient lying in bed, AAO, unlabored breathing, skin intact, reviewed and verbalized understanding of the plan of care, laftand right FA IV intact. NPO. WCTM

## 2019-04-10 NOTE — PLAN OF CARE
BONI met with the Pt at the bedside.  Mr. Griffith reports he will call his cousin Shlomo Rodriguez [phone] and ask him to transport him to his home. Pt reports he will stay with his cousin (address on face sheet) when he is discharged.

## 2019-04-10 NOTE — ASSESSMENT & PLAN NOTE
- Will need to talk with case management and social work regarding resources for patient as he does not have insurance.

## 2019-04-10 NOTE — PROGRESS NOTES
[facility] Hospital Medicine  Progress Note    Patient Name: Evaristo Griffith  MRN: [id number]  Patient Class: IP- Inpatient   Admission Date: 4/6/2019  Length of Stay: 4 days  Attending Physician: Nicole Pardo MD  Primary Care Provider: Primary Doctor [facility] Medicine Team: [facility] MED 5 Paras Izaguirre MD    Subjective:     Principal Problem:Lumbar spine tumor    HPI:  64 y/o male with essential HTN, CAD, ETOH abuse and prior CVA was admitted to [facility] early this am on 4/5 with bilateral lower extremity weakness for about 1 week. He also complained of loss of sensation in bilateral legs, and issues with constipation.  By the time he had arrived at the ED in [place] he states that most of his symptoms had began to resolve.  Patient noted on admit to have mild OMARI of Cr 1.4 with mild hyponatremia with sodium of 126. Patient given IVFs and a Banana bag and last Cr down to 1.0 and sodium improved to 132. Patient on admit had Hgb 7.1 but after IVFs has now dropped to 6.5. Patient in process of being transfused 1 unit of PBCs. Patient with no obvious active bleeding but was started on IV Protonix at 40 mg BID. Patient denies any melena or BRBPR, epistaxis or hematuria or hemoptysis. Patient also on labs noted to have normal AST, ALT, T. pete but elevated . Patient noted to have normal TSH, Vitamin B12 and folate levels. Patient noted on exam to have decreased strength in lower extremities 1/5 and decreased sensation but reflexes intact with some muscle wasting to lower extremities. Patient with no bladder or bowel incontinence. Rectal tone not checked. Due to leg weakness patient underwent MRI of brain that was unremarkable except for remote infarct but MRI of lumbar spine revealed spinal tumor at L4 with extraosseous extension causing compression to thecal sac at that level as well as widespread other osseous lesions of spine highly concerning  for metastatic cancer. Patient with no history of cancer. Dr. Briscoe from Neurosurgery was conferenced into call and he stated patient likely would need spinal decompression but asked for hospital medicine admit due to other medical needs such as anemia evaluation and help with coordination of care as patient has suspected metastatic cancer of unknown primary. Dr. Briscoe asked for Neurosurgery to be consulted. Patient was told results of MRI by transferring physician.         Hospital Course:  Patient denies any symptoms of weakness at this time. Denies loss of sensation. He is able to walk and control urination & bowel movements. 4/8, patient reports feeling well over all although he is still straining occasionally to urinate. Neurosurgery has evaluated him, no acute intervention at this time, and they have recommended consult to radiation oncology. Interventional radiology has recommended vertebral bone biopsy as an outpatient. Will consult radiation oncology.  4/9: Patient feels well overall other than straining to urinate. Informed by neurosurgery that he will be getting IR biopsy tomorrow. Following this, he will need to follow up with neurosurgery and radiation oncology outpatient.  aware, will seek resources in [place] as he does not have insurance.   4/10: Patient to go to procedure today. Will talk with social work regarding resources for the patient back in [place] for the uninsured.     Interval History: No acute events overnight. Procedure for biopsy today at 1:40 pm    Review of Systems     Constitutional: Positive for activity change and appetite change. Negative for chills, fatigue and fever.   HENT: Negative for congestion and sinus pain.    Eyes: Negative for visual disturbance.   Respiratory: Negative for cough and shortness of breath.    Cardiovascular: Negative for chest pain and palpitations.   Gastrointestinal: Negative for abdominal distention and abdominal pain.    Genitourinary: Positive for difficulty urinating.   Musculoskeletal: Positive for back pain and gait problem (boht improved). Negative for arthralgias.   Skin: Negative for color change and pallor.   Neurological: Positive for weakness (improved).  Negative for numbness. for dizziness, light-headedness and headaches.    Objective:     Vital Signs (Most Recent):  Temp: 98.1 °F (36.7 °C) (04/10/19 0400)  Pulse: 76 (04/10/19 0400)  Resp: 18 (04/10/19 0400)  BP: 124/68 (04/10/19 0400)  SpO2: 98 % (04/10/19 0400) Vital Signs (24h Range):  Temp:  [96.3 °F (35.7 °C)-98.1 °F (36.7 °C)] 98.1 °F (36.7 °C)  Pulse:  [61-76] 76  Resp:  [18] 18  SpO2:  [94 %-98 %] 98 %  BP: (124-172)/(68-83) 124/68     Weight: 63.6 kg (140 lb 3.4 oz)  Body mass index is 19.02 kg/m².    Intake/Output Summary (Last 24 hours) at 4/10/2019 0710  Last data filed at 4/10/2019 0600  Gross per 24 hour   Intake 360 ml   Output --   Net 360 ml      Physical Exam    Constitutional: He is oriented to person, place, and time. He appears well-developed and well-nourished. No distress.   HENT:   Head: Normocephalic and atraumatic.   Eyes: Conjunctivae and EOM are normal.   Neck: Normal range of motion.   Cardiovascular: Normal rate, regular rhythm and normal heart sounds.   No murmur heard.  Pulmonary/Chest: Effort normal and breath sounds normal. No respiratory distress.   Abdominal: Soft. Bowel sounds are normal. He exhibits no distension. There is no tenderness.   Musculoskeletal: Normal range of motion. He exhibits no edema or deformity.   Neurological: He is alert and oriented to person, place, and time. No cranial nerve deficit or sensory deficit.   5/5 strength BUE and BLE  5/5 plantar and dorsiflexion   Skin: Skin is warm. Capillary refill takes less than 2 seconds.   Nursing note and vitals reviewed.    Significant Labs: All pertinent labs within the past 24 hours have been reviewed.    Significant Imaging: I have reviewed all pertinent imaging  results/findings within the past 24 hours.    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      Discharge planning issues  - Will need to talk with case management and social work regarding resources for patient as he does not have insurance.      Hyponatremia  - Will fluid restrict, recheck sodium for improvement      Acute blood loss anemia  No obvious signs of bleeding.  Was intiailly 7.1 on presentation, received IVF and subsequent Hgb was 6.5.  Received 1u pRBC before transfer.    Hg stable, will stop daily cbc.    Metastatic cancer to bone  63M PMH HTN, CAD, ETOH abuse and prior CVA presenting with 1 week of BLE weakness and numbness that has resolved. MRI reveals L4 mass and osseous lesions concerning for metastatic disease.     -- PSA elevated 500s  -- bone biopsy 4/10    Coronary artery disease involving native coronary artery of native heart  Holding ASA and Plavix for procedure      Essential hypertension  Is on coreg 25 BID and norvasc 10 at home but reports only taking it when he has a headache. Held earlier for hypotension. Added back coreg      VTE Risk Mitigation (From admission, onward)        Ordered     IP VTE HIGH RISK PATIENT  Once      04/06/19 9854              Paras Izaguirre MD  Department of Hospital Medicine   [facility]

## 2019-04-10 NOTE — ASSESSMENT & PLAN NOTE
-- decadron 4mg q 6hr, will taper upon discharge  -- he will require mass biopsy, will be done by IR 4/10  -- Call NSGY with decline in exam.

## 2019-04-10 NOTE — SUBJECTIVE & OBJECTIVE
Interval History: No acute events overnight. Procedure for biopsy today at 1:40 pm    Review of Systems     Constitutional: Positive for activity change and appetite change. Negative for chills, fatigue and fever.   HENT: Negative for congestion and sinus pain.    Eyes: Negative for visual disturbance.   Respiratory: Negative for cough and shortness of breath.    Cardiovascular: Negative for chest pain and palpitations.   Gastrointestinal: Negative for abdominal distention and abdominal pain.   Genitourinary: Positive for difficulty urinating.   Musculoskeletal: Positive for back pain and gait problem (boht improved). Negative for arthralgias.   Skin: Negative for color change and pallor.   Neurological: Positive for weakness (improved).  Negative for numbness. for dizziness, light-headedness and headaches.    Objective:     Vital Signs (Most Recent):  Temp: 98.1 °F (36.7 °C) (04/10/19 0400)  Pulse: 76 (04/10/19 0400)  Resp: 18 (04/10/19 0400)  BP: 124/68 (04/10/19 0400)  SpO2: 98 % (04/10/19 0400) Vital Signs (24h Range):  Temp:  [96.3 °F (35.7 °C)-98.1 °F (36.7 °C)] 98.1 °F (36.7 °C)  Pulse:  [61-76] 76  Resp:  [18] 18  SpO2:  [94 %-98 %] 98 %  BP: (124-172)/(68-83) 124/68     Weight: 63.6 kg (140 lb 3.4 oz)  Body mass index is 19.02 kg/m².    Intake/Output Summary (Last 24 hours) at 4/10/2019 0710  Last data filed at 4/10/2019 0600  Gross per 24 hour   Intake 360 ml   Output --   Net 360 ml      Physical Exam    Constitutional: He is oriented to person, place, and time. He appears well-developed and well-nourished. No distress.   HENT:   Head: Normocephalic and atraumatic.   Eyes: Conjunctivae and EOM are normal.   Neck: Normal range of motion.   Cardiovascular: Normal rate, regular rhythm and normal heart sounds.   No murmur heard.  Pulmonary/Chest: Effort normal and breath sounds normal. No respiratory distress.   Abdominal: Soft. Bowel sounds are normal. He exhibits no distension. There is no tenderness.    Musculoskeletal: Normal range of motion. He exhibits no edema or deformity.   Neurological: He is alert and oriented to person, place, and time. No cranial nerve deficit or sensory deficit.   5/5 strength BUE and BLE  5/5 plantar and dorsiflexion   Skin: Skin is warm. Capillary refill takes less than 2 seconds.   Nursing note and vitals reviewed.    Significant Labs: All pertinent labs within the past 24 hours have been reviewed.    Significant Imaging: I have reviewed all pertinent imaging results/findings within the past 24 hours.

## 2019-04-10 NOTE — PROGRESS NOTES
Pt recovery complete per protocol. VSS. Procedural site CDI. Pt transferred via stretcher with transport staff to [facility].

## 2019-04-10 NOTE — ASSESSMENT & PLAN NOTE
No obvious signs of bleeding.  Was intiailly 7.1 on presentation, received IVF and subsequent Hgb was 6.5.  Received 1u pRBC before transfer.    Hg stable, will stop daily cbc.

## 2019-04-11 VITALS
HEART RATE: 62 BPM | OXYGEN SATURATION: 100 % | BODY MASS INDEX: 18.99 KG/M2 | SYSTOLIC BLOOD PRESSURE: 135 MMHG | HEIGHT: 72 IN | RESPIRATION RATE: 16 BRPM | WEIGHT: 140.19 LBS | DIASTOLIC BLOOD PRESSURE: 78 MMHG | TEMPERATURE: 97 F

## 2019-04-11 LAB
PATHOLOGIST INTERPRETATION UPE: NORMAL
PROT PATTERN UR ELPH-IMP: NORMAL

## 2019-04-11 PROCEDURE — 25000003 PHARM REV CODE 250: Performed by: STUDENT IN AN ORGANIZED HEALTH CARE EDUCATION/TRAINING PROGRAM

## 2019-04-11 PROCEDURE — 63600175 PHARM REV CODE 636 W HCPCS: Performed by: STUDENT IN AN ORGANIZED HEALTH CARE EDUCATION/TRAINING PROGRAM

## 2019-04-11 PROCEDURE — 99239 HOSP IP/OBS DSCHRG MGMT >30: CPT | Mod: ,,, | Performed by: HOSPITALIST

## 2019-04-11 PROCEDURE — 99239 PR HOSPITAL DISCHARGE DAY,>30 MIN: ICD-10-PCS | Mod: ,,, | Performed by: HOSPITALIST

## 2019-04-11 RX ORDER — DEXAMETHASONE 2 MG/1
TABLET ORAL
Qty: 75 TABLET | Refills: 0 | Status: SHIPPED | OUTPATIENT
Start: 2019-04-11 | End: 2019-04-26

## 2019-04-11 RX ORDER — PANTOPRAZOLE SODIUM 40 MG/1
40 TABLET, DELAYED RELEASE ORAL DAILY
Qty: 30 TABLET | Refills: 0 | Status: SHIPPED | OUTPATIENT
Start: 2019-04-11 | End: 2020-04-10

## 2019-04-11 RX ORDER — DEXAMETHASONE 2 MG/1
TABLET ORAL
Qty: 75 TABLET | Refills: 0 | Status: SHIPPED | OUTPATIENT
Start: 2019-04-11 | End: 2019-04-11 | Stop reason: SDUPTHER

## 2019-04-11 RX ORDER — TAMSULOSIN HYDROCHLORIDE 0.4 MG/1
0.4 CAPSULE ORAL DAILY
Qty: 30 CAPSULE | Refills: 0 | Status: SHIPPED | OUTPATIENT
Start: 2019-04-11 | End: 2020-04-10

## 2019-04-11 RX ORDER — PANTOPRAZOLE SODIUM 40 MG/1
40 TABLET, DELAYED RELEASE ORAL DAILY
Qty: 60 TABLET | Refills: 0 | Status: SHIPPED | OUTPATIENT
Start: 2019-04-11 | End: 2019-04-11

## 2019-04-11 RX ORDER — TAMSULOSIN HYDROCHLORIDE 0.4 MG/1
0.4 CAPSULE ORAL DAILY
Qty: 60 CAPSULE | Refills: 0 | Status: SHIPPED | OUTPATIENT
Start: 2019-04-11 | End: 2019-04-11

## 2019-04-11 RX ADMIN — DEXAMETHASONE SODIUM PHOSPHATE 4 MG: 4 INJECTION, SOLUTION INTRA-ARTICULAR; INTRALESIONAL; INTRAMUSCULAR; INTRAVENOUS; SOFT TISSUE at 06:04

## 2019-04-11 RX ADMIN — PANTOPRAZOLE SODIUM 40 MG: 40 TABLET, DELAYED RELEASE ORAL at 08:04

## 2019-04-11 RX ADMIN — DEXAMETHASONE SODIUM PHOSPHATE 4 MG: 4 INJECTION, SOLUTION INTRA-ARTICULAR; INTRALESIONAL; INTRAMUSCULAR; INTRAVENOUS; SOFT TISSUE at 12:04

## 2019-04-11 RX ADMIN — TAMSULOSIN HYDROCHLORIDE 0.4 MG: 0.4 CAPSULE ORAL at 08:04

## 2019-04-11 RX ADMIN — CARVEDILOL 12.5 MG: 12.5 TABLET, FILM COATED ORAL at 08:04

## 2019-04-11 NOTE — NURSING
Patient discharged in stable condition via wheelchair ambulace, instruction given, IV's removed.   Prescriptions e-scribed to [facility] in [place].

## 2019-04-11 NOTE — DISCHARGE SUMMARY
[facility] Hospital Medicine  Discharge Summary      Patient Name: Evaristo Griffith  MRN: [id number]  Admission Date: 4/6/2019  Hospital Length of Stay: 5 days  Discharge Date and Time:  04/11/2019 2:32 PM  Attending Physician: Nicole Pardo MD   Discharging Provider: Paras Izaguirre MD  Primary Care Provider: Primary Doctor [facility] Medicine Team: [facility] 5 Paras Izaguirre MD    HPI:   64 y/o male with essential HTN, CAD, ETOH abuse and prior CVA was admitted to [facility] early this am on 4/5 with bilateral lower extremity weakness for about 1 week. He also complained of loss of sensation in bilateral legs, and issues with constipation.  By the time he had arrived at the ED in [place] he states that most of his symptoms had began to resolve.  Patient noted on admit to have mild OMARI of Cr 1.4 with mild hyponatremia with sodium of 126. Patient given IVFs and a Banana bag and last Cr down to 1.0 and sodium improved to 132. Patient on admit had Hgb 7.1 but after IVFs has now dropped to 6.5. Patient in process of being transfused 1 unit of PBCs. Patient with no obvious active bleeding but was started on IV Protonix at 40 mg BID. Patient denies any melena or BRBPR, epistaxis or hematuria or hemoptysis. Patient also on labs noted to have normal AST, ALT, T. pete but elevated . Patient noted to have normal TSH, Vitamin B12 and folate levels. Patient noted on exam to have decreased strength in lower extremities 1/5 and decreased sensation but reflexes intact with some muscle wasting to lower extremities. Patient with no bladder or bowel incontinence. Rectal tone not checked. Due to leg weakness patient underwent MRI of brain that was unremarkable except for remote infarct but MRI of lumbar spine revealed spinal tumor at L4 with extraosseous extension causing compression to thecal sac at that level as well as widespread other osseous lesions of spine  highly concerning for metastatic cancer. Patient with no history of cancer. Dr. Briscoe from Neurosurgery was conferenced into call and he stated patient likely would need spinal decompression but asked for hospital medicine admit due to other medical needs such as anemia evaluation and help with coordination of care as patient has suspected metastatic cancer of unknown primary. Dr. Briscoe asked for Neurosurgery to be consulted. Patient was told results of MRI by transferring physician.         * No surgery found *      Hospital Course:   Patient denies any symptoms of weakness at this time. Denies loss of sensation. He is able to walk and control urination & bowel movements. 4/8, patient reports feeling well over all although he is still straining occasionally to urinate. Neurosurgery has evaluated him, no acute intervention at this time, and they have recommended consult to radiation oncology. Interventional radiology has recommended vertebral bone biopsy as an outpatient. Will consult radiation oncology.  4/9: Patient feels well overall other than straining to urinate. Informed by neurosurgery that he will be getting IR biopsy tomorrow. Following this, he will need to follow up with neurosurgery and radiation oncology outpatient.  aware, will seek resources in [place] as he does not have insurance.   4/10: Patient to go to procedure today. Will talk with social work regarding resources for the patient back in [place] for the uninsured.   4/11: Patient stable after biopsy. Patient left prior to talking to pharmacist and getting medications from [facility]. Will continue to try to contact patient to inform him that his medications have been sent to [place].      Vital Signs (Most Recent):  Temp: 98.1 °F (36.7 °C) (04/11/19 0000)  Pulse: (!) 58 (04/11/19 0000)  Resp: 18 (04/11/19 0000)  BP: (!) 165/88 (04/11/19 0000)  SpO2: 98 % (04/11/19 0000) Vital Signs (24h Range):  Temp:  [97.4 °F  (36.3 °C)-98.4 °F (36.9 °C)] 98.1 °F (36.7 °C)  Pulse:  [56-77] 58  Resp:  [10-18] 18  SpO2:  [95 %-99 %] 98 %  BP: (130-186)/(64-93) 165/88     Weight: 63.6 kg (140 lb 3.4 oz)  Body mass index is 19.02 kg/m².    Intake/Output Summary (Last 24 hours) at 4/11/2019 0749  Last data filed at 4/11/2019 0609  Gross per 24 hour   Intake 720 ml   Output 420 ml   Net 300 ml      Physical Exam    Constitutional: He is oriented to person, place, and time. He appears well-developed and well-nourished. No distress.   HENT:   Head: Normocephalic and atraumatic.   Eyes: Conjunctivae and EOM are normal.   Neck: Normal range of motion.   Cardiovascular: Normal rate, regular rhythm and normal heart sounds.   No murmur heard.  Pulmonary/Chest: Effort normal and breath sounds normal. No respiratory distress.   Abdominal: Soft. Bowel sounds are normal. He exhibits no distension. There is no tenderness.   Musculoskeletal: Normal range of motion. He exhibits no edema or deformity.   Neurological: He is alert and oriented to person, place, and time. No cranial nerve deficit or sensory deficit.   5/5 strength BUE and BLE  5/5 plantar and dorsiflexion   Skin: Skin is warm. Capillary refill takes less than 2 seconds.   Nursing note and vitals reviewed.    Consults:   Consults (From admission, onward)        Status Ordering Provider     Inpatient consult to Interventional Radiology  Once     Provider:  (Not yet assigned)    Completed NATIVIDAD VALLES     Inpatient consult to Neurosurgery  Once     Provider:  (Not yet assigned)    TALON Thompson     Inpatient consult to Radiation Oncology  Once     Provider:  (Not yet assigned)    Completed VIJAY CALLOWAY          * Lumbar spine tumor at L4  -- decadron 4mg q 6hr, will taper upon discharge  -- he will require mass biopsy, will be done by IR 4/10  -- Call NSGY with decline in exam.      Discharge planning issues  - Will need to talk with case management and social  work regarding resources for patient as he does not have insurance.      Hyponatremia  - Will fluid restrict, recheck sodium for improvement      Acute blood loss anemia  No obvious signs of bleeding.  Was intiailly 7.1 on presentation, received IVF and subsequent Hgb was 6.5.  Received 1u pRBC before transfer.    Hg stable, will stop daily cbc.    Metastatic cancer to bone  63M PMH HTN, CAD, ETOH abuse and prior CVA presenting with 1 week of BLE weakness and numbness that has resolved. MRI reveals L4 mass and osseous lesions concerning for metastatic disease.     -- PSA elevated 500s  -- bone biopsy 4/10        Coronary artery disease involving native coronary artery of native heart  Holding ASA and Plavix for procedure      Essential hypertension  Is on coreg 25 BID and norvasc 10 at home but reports only taking it when he has a headache. Held earlier for hypotension. Added back coreg        Final Active Diagnoses:    Diagnosis Date Noted POA    PRINCIPAL PROBLEM:  Lumbar spine tumor at L4 [D49.2] 04/05/2019 Yes    Discharge planning issues [Z02.9] 04/10/2019 Not Applicable    Metastatic cancer to bone [C79.51] 04/05/2019 Yes    Acute blood loss anemia [D62] 04/05/2019 Yes    Hyponatremia [E87.1] 04/05/2019 Yes    Essential hypertension [I10]  Yes    Coronary artery disease involving native coronary artery of native heart [I25.10]  Yes    Alcohol abuse [F10.10]  Yes      Problems Resolved During this Admission:       Discharged Condition: stable    Disposition: Home or Self Care    Follow Up:  Follow-up Information     Please follow up.    Why:  Clinic Nurse Karla to contact patient for follow-up appointment when volunteer physicians are available either end of April or first week of May.  Contact information:  [facility]  [address], [place], [address] [phone]               Patient Instructions:   No discharge procedures on file.    Significant Diagnostic Studies:  Labs:   CMP   Recent Labs   Lab 04/10/19  0440   *   K 4.3      CO2 23   *   BUN 20   CREATININE 0.8   CALCIUM 8.0*   PROT 6.2   ALBUMIN 2.4*   BILITOT 0.3   ALKPHOS 550*   AST 24   ALT 30   ANIONGAP 5*   ESTGFRAFRICA >60.0   EGFRNONAA >60.0    and CBC   Recent Labs   Lab 04/10/19  0440   WBC 7.09   HGB 7.8*   HCT 25.9*          Pending Diagnostic Studies:     None         Medications:  Reconciled Home Medications:      Medication List      START taking these medications    dexamethasone 2 MG tablet  Commonly known as:  DECADRON  Take 2 tablets (4 mg total) by mouth every 6 (six) hours for 3 days, THEN 2 tablets (4 mg total) every 8 (eight) hours for 3 days, THEN 2 tablets (4 mg total) every 12 (twelve) hours for 3 days, THEN 1 tablet (2 mg total) every 8 (eight) hours for 3 days, THEN 1 tablet (2 mg total) every 12 (twelve) hours for 3 days.  Start taking on:  4/11/2019     pantoprazole 40 MG tablet  Commonly known as:  PROTONIX  Take 1 tablet (40 mg total) by mouth once daily.     tamsulosin 0.4 mg Cap  Commonly known as:  FLOMAX  Take 1 capsule (0.4 mg total) by mouth once daily.        CONTINUE taking these medications    acetaminophen 500 MG tablet  Commonly known as:  TYLENOL  Take 500-1,000 mg by mouth daily as needed (back pain).     GOODY'S HEADACHE POWDER ORAL  Take 2 Packages by mouth daily as needed (back pain).            Indwelling Lines/Drains at time of discharge:   Lines/Drains/Airways          None          Time spent on the discharge of patient: 30 minutes  Patient was seen and examined on the date of discharge and determined to be suitable for discharge.         Paras Izaguirre MD  Department of Hospital Medicine  [facility]

## 2019-04-11 NOTE — NURSING
Patient lying in bed, AAO, unlabored breathing, skin intact, reviewed and verbalized understanding of the plan of care, left and right FA IV intact. NPO. WCTM

## 2019-04-11 NOTE — SUBJECTIVE & OBJECTIVE
Interval History: No acute events overnight.     Review of Systems     Constitutional: Positive for activity change and appetite change. Negative for chills, fatigue and fever.   HENT: Negative for congestion and sinus pain.    Eyes: Negative for visual disturbance.   Respiratory: Negative for cough and shortness of breath.    Cardiovascular: Negative for chest pain and palpitations.   Gastrointestinal: Negative for abdominal distention and abdominal pain.   Genitourinary: Positive for difficulty urinating.   Musculoskeletal: Positive for back pain and gait problem (boht improved). Negative for arthralgias.   Skin: Negative for color change and pallor.   Neurological: Positive for weakness (improved).  Negative for numbness. for dizziness, light-headedness and headaches.    Objective:     Vital Signs (Most Recent):  Temp: 98.1 °F (36.7 °C) (04/11/19 0000)  Pulse: (!) 58 (04/11/19 0000)  Resp: 18 (04/11/19 0000)  BP: (!) 165/88 (04/11/19 0000)  SpO2: 98 % (04/11/19 0000) Vital Signs (24h Range):  Temp:  [97.4 °F (36.3 °C)-98.4 °F (36.9 °C)] 98.1 °F (36.7 °C)  Pulse:  [56-77] 58  Resp:  [10-18] 18  SpO2:  [95 %-99 %] 98 %  BP: (130-186)/(64-93) 165/88     Weight: 63.6 kg (140 lb 3.4 oz)  Body mass index is 19.02 kg/m².    Intake/Output Summary (Last 24 hours) at 4/11/2019 0749  Last data filed at 4/11/2019 0609  Gross per 24 hour   Intake 720 ml   Output 420 ml   Net 300 ml      Physical Exam    Constitutional: He is oriented to person, place, and time. He appears well-developed and well-nourished. No distress.   HENT:   Head: Normocephalic and atraumatic.   Eyes: Conjunctivae and EOM are normal.   Neck: Normal range of motion.   Cardiovascular: Normal rate, regular rhythm and normal heart sounds.   No murmur heard.  Pulmonary/Chest: Effort normal and breath sounds normal. No respiratory distress.   Abdominal: Soft. Bowel sounds are normal. He exhibits no distension. There is no tenderness.   Musculoskeletal: Normal  range of motion. He exhibits no edema or deformity.   Neurological: He is alert and oriented to person, place, and time. No cranial nerve deficit or sensory deficit.   5/5 strength BUE and BLE  5/5 plantar and dorsiflexion   Skin: Skin is warm. Capillary refill takes less than 2 seconds.   Nursing note and vitals reviewed.    Significant Labs: All pertinent labs within the past 24 hours have been reviewed.    Significant Imaging: I have reviewed all pertinent imaging results/findings within the past 24 hours.

## 2019-04-11 NOTE — PLAN OF CARE
Patient discharged home to care of self.     04/11/19 1357   Final Note   Assessment Type Final Discharge Note   Anticipated Discharge Disposition Home   What phone number can be called within the next 1-3 days to see how you are doing after discharge?   ([phone])   Hospital Follow Up  Appt(s) scheduled? Yes   Discharge plans and expectations educations in teach back method with documentation complete? Yes   Right Care Referral Info   Post Acute Recommendation No Care

## 2019-04-11 NOTE — CARE UPDATE
Phoned Mr. Griffith's only contact; Ms. Yvonne Ledesma. Informed her that Mr. Griffith left prior to receiving his medications. Updated her on the situation and told her that Mr. Griffith's medications were sent sent to [facility] in [facility]. Patient is self pay and without insurance. I told her that if he were to get his medication that he will need to prioritize his decadron. She informed me that they cannot afford any medications. I will speak to our pharmacist to see what our options are.

## 2021-02-10 NOTE — PROCEDURES
Radiology Post-Procedure Note    Pre Op Diagnosis: L4 vertebral body bone biopsy    Post Op Diagnosis:  Same    Procedure:L4 vertebral body bone biopsy    Procedure performed by: Allen James MD    Written Informed Consent Obtained: Yes    Specimen Removed: YES; 2 core biopsies obtained for pathology review    Estimated Blood Loss: Minimal    Findings: Successful L4 vertebral body bone biopsy.  Specimen sent to pathology.     Patient tolerated procedure well.    Arie Quinn M.D.  PGY-3 Radiology     (2) well flexed Affect and characteristics of appearance, verbalizations, behaviors are appropriate negative